# Patient Record
Sex: MALE | Race: WHITE | Employment: FULL TIME | URBAN - METROPOLITAN AREA
[De-identification: names, ages, dates, MRNs, and addresses within clinical notes are randomized per-mention and may not be internally consistent; named-entity substitution may affect disease eponyms.]

---

## 2017-05-31 ENCOUNTER — OFFICE VISIT (OUTPATIENT)
Dept: OPTOMETRY | Facility: CLINIC | Age: 37
End: 2017-05-31
Payer: COMMERCIAL

## 2017-05-31 DIAGNOSIS — H57.89 IRRITATION OF RIGHT EYE: Primary | ICD-10-CM

## 2017-05-31 PROCEDURE — 99202 OFFICE O/P NEW SF 15 MIN: CPT | Performed by: OPTOMETRIST

## 2017-05-31 RX ORDER — CETIRIZINE HYDROCHLORIDE 10 MG/1
10 TABLET ORAL DAILY
COMMUNITY
End: 2018-10-18

## 2017-05-31 ASSESSMENT — VISUAL ACUITY
OS_SC: 20/25
OD_SC+: -1
OS_SC+: -1
METHOD: SNELLEN - LINEAR
OD_SC: 20/20

## 2017-05-31 ASSESSMENT — SLIT LAMP EXAM - LIDS: COMMENTS: NORMAL

## 2017-05-31 ASSESSMENT — EXTERNAL EXAM - LEFT EYE: OS_EXAM: NORMAL

## 2017-05-31 ASSESSMENT — EXTERNAL EXAM - RIGHT EYE: OD_EXAM: NORMAL

## 2017-05-31 NOTE — PROGRESS NOTES
"Chief Complaint   Patient presents with     Eye Problem Right Eye     FB sensation x 3 days        Here with wife   HPI    Affected eye(s):  Right   Symptoms:     Redness   Foreign body sensation   No photophobia      Duration:  4 days   Frequency:  Constant       Do you have eye pain now?:  No      Comments:  For the last 4 days he has had a foreign body sensation under right lateral upper lid that is annoying. Started while at work on night shift, has been rubbing eye.  No history of power tool use or sweeping, no memory of something landing in right eye. Whatever it was felt like it was in one place, it didn't seem to move or float around.     Today it seems a bit better- \"just phantom irritation now\" no foreign body sensation any longer. Patient does not wear glasses or contacts, he hasn't had an exam for 10+ years              HPI and ROS performed by Pari Goldman, OD  scribed by Cecilia Apple Optometric Assistant       See Review Of Systems     Medical, surgical and family histories reviewed and updated 5/31/2017.         OBJECTIVE: See Ophthalmology exam    ASSESSMENT:    ICD-10-CM    1. Irritation of right eye H57.8 polyethylene glycol 400 (BLINK TEARS) 0.25 % SOLN ophthalmic solution      PLAN:    Patient Instructions   Patient was advised of today's exam findings.  Use artificial tears twice a day  Avoid touching right eye         Pari Goldman O.D.  [facility]   [address] [place], [place] [id number] [phone]       "

## 2017-05-31 NOTE — PATIENT INSTRUCTIONS
Patient was advised of today's exam findings.  Use artificial tears twice a day  Avoid touching right eye         Pari Goldman O.D.  [facility]   [address] [place], [place] [id number] [phone]

## 2017-05-31 NOTE — MR AVS SNAPSHOT
"              After Visit Summary   2017    Oskar Genao    MRN: [id number]           Patient Information     Date Of Birth          1980        Visit Information        Provider Department      2017 9:30 AM Pari Goldman OD [facility]        Care Instructions    Patient was advised of today's exam findings.  Use artificial tears twice a day  Avoid touching right eye         Pari Goldman O.D.  [facility]   [address] [place] [address]  [phone]            Follow-ups after your visit        Who to contact     If you have questions or need follow up information about today's clinic visit or your schedule please contact [facility] directly at [phone].  Normal or non-critical lab and imaging results will be communicated to you by MyChart, letter or phone within 4 business days after the clinic has received the results. If you do not hear from us within 7 days, please contact the clinic through MyChart or phone. If you have a critical or abnormal lab result, we will notify you by phone as soon as possible.  Submit refill requests through [employer] or call your pharmacy and they will forward the refill request to us. Please allow 3 business days for your refill to be completed.          Additional Information About Your Visit        MyCharmChron Information     [employer] lets you send messages to your doctor, view your test results, renew your prescriptions, schedule appointments and more. To sign up, go to [email or web address] . Click on \"Log in\" on the left side of the screen, which will take you to the Welcome page. Then click on \"Sign up Now\" on the right side of the page.     You will be asked to enter the access code listed below, as well as some personal information. Please follow the directions to create your username and password.     Your access code is: [email or web address]  Expires: 2017 10:14 AM     Your access code will  in 90 " days. If you need help or a new code, please call your [facility] clinic or [phone].        Care EveryWhere ID     This is your Care EveryWhere ID. This could be used by other organizations to access your [facility] medical records  [id number]         Blood Pressure from Last 3 Encounters:   12/21/16 122/75   08/11/16 115/78   07/11/16 117/83    Weight from Last 3 Encounters:   12/21/16 85.3 kg (188 lb)   11/01/16 86.2 kg (190 lb)   08/11/16 88 kg (194 lb)              Today, you had the following     No orders found for display       Primary Care Provider Office Phone # Fax #    Jeremie Vizcaino PA-C [phone]       [facility] [address] [facility] [place] [place] [address]        Thank you!     Thank you for choosing [facility]  for your care. Our goal is always to provide you with excellent care. Hearing back from our patients is one way we can continue to improve our services. Please take a few minutes to complete the written survey that you may receive in the mail after your visit with us. Thank you!             Your Updated Medication List - Protect others around you: Learn how to safely use, store and throw away your medicines at www.disposemymeds.org.          This list is accurate as of: 5/31/17 10:14 AM.  Always use your most recent med list.                   Brand Name Dispense Instructions for use    cetirizine 10 MG tablet    zyrTEC     Take 10 mg by mouth daily       FLONASE NA

## 2017-07-17 ENCOUNTER — TELEPHONE (OUTPATIENT)
Dept: INTERNAL MEDICINE | Facility: CLINIC | Age: 37
End: 2017-07-17

## 2017-07-17 ENCOUNTER — RADIANT APPOINTMENT (OUTPATIENT)
Dept: GENERAL RADIOLOGY | Facility: CLINIC | Age: 37
End: 2017-07-17
Attending: INTERNAL MEDICINE
Payer: COMMERCIAL

## 2017-07-17 ENCOUNTER — OFFICE VISIT (OUTPATIENT)
Dept: INTERNAL MEDICINE | Facility: CLINIC | Age: 37
End: 2017-07-17
Payer: COMMERCIAL

## 2017-07-17 VITALS
OXYGEN SATURATION: 99 % | HEART RATE: 68 BPM | WEIGHT: 201 LBS | DIASTOLIC BLOOD PRESSURE: 71 MMHG | TEMPERATURE: 97.1 F | HEIGHT: 71 IN | SYSTOLIC BLOOD PRESSURE: 119 MMHG | BODY MASS INDEX: 28.14 KG/M2

## 2017-07-17 DIAGNOSIS — M25.572 ACUTE LEFT ANKLE PAIN: Primary | ICD-10-CM

## 2017-07-17 DIAGNOSIS — M25.572 ACUTE LEFT ANKLE PAIN: ICD-10-CM

## 2017-07-17 PROCEDURE — 99213 OFFICE O/P EST LOW 20 MIN: CPT | Performed by: INTERNAL MEDICINE

## 2017-07-17 PROCEDURE — 73610 X-RAY EXAM OF ANKLE: CPT | Mod: LT

## 2017-07-17 NOTE — LETTER
[facility]  [address] [facility] [id number] [phone]        July 18, 2017    Oskar Genao  [address]  [facility] [address]            Dear Oskar,    Your X-ray does not show any bone fractures or any other bone abnormalities of your ankle.    Please continue the treatment plan that we discussed at your last clinic visit and let me know if you have any questions via [employer] or by calling [phone].      Nani Sharma MD/mirna        Results for orders placed or performed in visit on 07/17/17   XR Ankle Left G/E 3 Views    Narrative    XR ANKLE LT G/E 3 VW 7/17/2017 11:12 AM    HISTORY: Pain.    COMPARISON: None.      Impression    IMPRESSION: No evidence of acute fracture or malalignment. Ankle  mortise is intact. There is soft tissue swelling overlying the lateral  malleolus.    MELINDA MARTINEZ MD

## 2017-07-17 NOTE — PATIENT INSTRUCTIONS
We will let you know if the radiologist noted something on the x-ray.  You may use ibuprofen: You may take ibuprofen as follows: 600mg every 6 hours as needed or 800mg every 8 hours as needed for pain.    Also see below:  Treating Ankle Sprains  Treatment will depend on how bad your sprain is. For a severe sprain, healing may take 3 months or more.  Right after your injury: Use R.I.C.E.    Rest: At first, keep weight off the ankle as much as you can. You may be given crutches to help you walk without putting weight on the ankle.    Ice: Put an ice pack on the ankle for 15 minutes. Remove the pack and wait at least 30 minutes. Repeat for up to 3 days. This helps reduce swelling.    Compression: To reduce swelling and keep the joint stable, you may need to wrap the ankle with an elastic bandage. For more severe sprains, you may need an ankle brace or a cast.    Elevation: To reduce swelling, keep your ankle raised above your heart when you sit or lie down.  Medicine  Your healthcare provider may suggest oral non-steroidal anti-inflammatory medicine (NSAIDs), such as ibuprofen. This relieves the pain and helps reduce any swelling. Be sure to take your medicine as directed.  Contrast baths  After 3 days, soak your ankle in warm water for 30 seconds, then in cool water for 30 seconds. Go back and forth for 5 minutes. Doing this every 2 hours will help keep the swelling down.  Exercises    After about 2 to 3 weeks, you may be given exercises to strengthen the ligaments and muscles in the ankle. Doing these exercises will help prevent another ankle sprain. Exercises may include standing on your toes and then on your heels and doing ankle curls.  Ankle curls    Sit on the edge of a sturdy table or lie on your back.    Pull your toes toward you. Then point them away from you. Repeat for 2 to 3 minutes.   Date Last Reviewed: 9/28/2015 2000-2017 [employer]. [address] [place] [address], [place] [address]. All  rights reserved. This information is not intended as a substitute for professional medical care. Always follow your healthcare professional's instructions.

## 2017-07-17 NOTE — PROGRESS NOTES
SUBJECTIVE:                                                    Oskar Genao is a 37 year old male who presents to clinic today for the following health issues:      Joint Pain    Onset: 2 weeks ago     Description:   Location: left ankle  Character: Sharp, Burning and grinding, throbbing    Intensity: 4/10    Progression of Symptoms: worse    Accompanying Signs & Symptoms:  Other symptoms: none    History:   Previous similar pain: no       Precipitating factors:   Trauma or overuse: YES- fell down stairs, feels like ankle rolled on stairs -no bruising at that time.     Alleviating factors:  Improved by: none    Therapies Tried and outcome:  rest/inactivity, ice and elevation and wrap. The ankle was better for a week and then it started getting worse.     The pain is getting worse.   Patient took ibuprofen 400mg yesterday-with partial affect.               Problem list and histories reviewed & adjusted, as indicated.  Additional history: as documented    Patient Active Problem List   Diagnosis     Hearing loss     Seasonal allergies     Lateral epicondylitis (tennis elbow), left     Hyperlipidemia LDL goal <130     Microscopic hematuria     History reviewed. No pertinent surgical history.    Social History   Substance Use Topics     Smoking status: Never Smoker     Smokeless tobacco: Not on file     Alcohol use No     Family History   Problem Relation Age of Onset     Glaucoma No family hx of      Macular Degeneration No family hx of          Current Outpatient Prescriptions   Medication Sig Dispense Refill     cetirizine (ZYRTEC) 10 MG tablet Take 10 mg by mouth daily       polyethylene glycol 400 (BLINK TEARS) 0.25 % SOLN ophthalmic solution Place 1 drop into both eyes 2 times daily as needed for dry eyes (Patient not taking: Reported on 7/17/2017)       Fluticasone Propionate (FLONASE NA)          Reviewed and updated as needed this visit by clinical staff  Tobacco  Allergies  Meds  Med Hx  Surg Hx  Fam  "Hx  Soc Hx      Reviewed and updated as needed this visit by Provider           ==============================================================  ROS:  Constitutional, HEENT, cardiovascular, pulmonary, GI, , musculoskeletal, neuro, skin, endocrine and psych systems are negative, except as otherwise noted.       OBJECTIVE:                                                    /71  Pulse 68  Temp 97.1  F (36.2  C) (Oral)  Ht 5' 10.5\" (1.791 m)  Wt 201 lb (91.2 kg)  SpO2 99%  BMI 28.43 kg/m2  Body mass index is 28.43 kg/(m^2).     GEN: not in acute distress  MSK:  left lower extremity:  swelling of the posterior aspect of the lateral malleolus and pain; also pain on eversion and on plantar flexion of the left foot. No NV compromise. No bruising.   Mild limping noted on ambulation.     XR ANKLE LT G/E 3 VW 7/17/2017 11:12 AM     HISTORY: Pain.     COMPARISON: None.         IMPRESSION: No evidence of acute fracture or malalignment. Ankle  mortise is intact. There is soft tissue swelling overlying the lateral  malleolus.     MELINDA MARTINEZ MD      ASSESSMENT/PLAN:                                                        ICD-10-CM    1. Acute left ankle pain M25.572 XR Ankle Left G/E 3 Views     ASSESSMENT: muscle strain; no bone pathology noted on XR  PLAN:  Patient Instructions     We will let you know if the radiologist noted something on the x-ray.  You may use ibuprofen: You may take ibuprofen as follows: 600mg every 6 hours as needed or 800mg every 8 hours as needed for pain.    Also see below:  Treating Ankle Sprains  Treatment will depend on how bad your sprain is. For a severe sprain, healing may take 3 months or more.  Right after your injury: Use R.I.C.E.    Rest: At first, keep weight off the ankle as much as you can. You may be given crutches to help you walk without putting weight on the ankle.    Ice: Put an ice pack on the ankle for 15 minutes. Remove the pack and wait at least 30 minutes. Repeat for " up to 3 days. This helps reduce swelling.    Compression: To reduce swelling and keep the joint stable, you may need to wrap the ankle with an elastic bandage. For more severe sprains, you may need an ankle brace or a cast.    Elevation: To reduce swelling, keep your ankle raised above your heart when you sit or lie down.  Medicine  Your healthcare provider may suggest oral non-steroidal anti-inflammatory medicine (NSAIDs), such as ibuprofen. This relieves the pain and helps reduce any swelling. Be sure to take your medicine as directed.  Contrast baths  After 3 days, soak your ankle in warm water for 30 seconds, then in cool water for 30 seconds. Go back and forth for 5 minutes. Doing this every 2 hours will help keep the swelling down.  Exercises    After about 2 to 3 weeks, you may be given exercises to strengthen the ligaments and muscles in the ankle. Doing these exercises will help prevent another ankle sprain. Exercises may include standing on your toes and then on your heels and doing ankle curls.  Ankle curls    Sit on the edge of a sturdy table or lie on your back.    Pull your toes toward you. Then point them away from you. Repeat for 2 to 3 minutes.   Date Last Reviewed: 9/28/2015 2000-2017 [employer]. [address] [place] [address], [place] [address]. All rights reserved. This information is not intended as a substitute for professional medical care. Always follow your healthcare professional's instructions.                         Nani Sharma MD  [facility]

## 2017-07-17 NOTE — TELEPHONE ENCOUNTER
Please advise the patient's wife that there is no noted fracture or other bone abnormalities on the X-ray. He should continue the treatment plan advise at his visit and return to clinic if his ankle is not better in a week, sooner if it gets worse.    Thank you,  Nani Sharma MD

## 2017-07-17 NOTE — NURSING NOTE
"Chief Complaint   Patient presents with     Trauma     left       Initial /71  Pulse 68  Temp 97.1  F (36.2  C) (Oral)  Ht 5' 10.5\" (1.791 m)  Wt 201 lb (91.2 kg)  SpO2 99%  BMI 28.43 kg/m2 Estimated body mass index is 28.43 kg/(m^2) as calculated from the following:    Height as of this encounter: 5' 10.5\" (1.791 m).    Weight as of this encounter: 201 lb (91.2 kg).  Medication Reconciliation: complete     Pretty Mooney MA      "

## 2017-07-17 NOTE — MR AVS SNAPSHOT
After Visit Summary   7/17/2017    Oskar Genao    MRN: [id number]           Patient Information     Date Of Birth          1980        Visit Information        Provider Department      7/17/2017 9:50 AM Nani Sharma MD [facility]        Today's Diagnoses     Acute left ankle pain    -  1      Care Instructions    We will let you know if the radiologist noted something on the x-ray.  You may use ibuprofen: You may take ibuprofen as follows: 600mg every 6 hours as needed or 800mg every 8 hours as needed for pain.    Also see below:  Treating Ankle Sprains  Treatment will depend on how bad your sprain is. For a severe sprain, healing may take 3 months or more.  Right after your injury: Use R.I.C.E.    Rest: At first, keep weight off the ankle as much as you can. You may be given crutches to help you walk without putting weight on the ankle.    Ice: Put an ice pack on the ankle for 15 minutes. Remove the pack and wait at least 30 minutes. Repeat for up to 3 days. This helps reduce swelling.    Compression: To reduce swelling and keep the joint stable, you may need to wrap the ankle with an elastic bandage. For more severe sprains, you may need an ankle brace or a cast.    Elevation: To reduce swelling, keep your ankle raised above your heart when you sit or lie down.  Medicine  Your healthcare provider may suggest oral non-steroidal anti-inflammatory medicine (NSAIDs), such as ibuprofen. This relieves the pain and helps reduce any swelling. Be sure to take your medicine as directed.  Contrast baths  After 3 days, soak your ankle in warm water for 30 seconds, then in cool water for 30 seconds. Go back and forth for 5 minutes. Doing this every 2 hours will help keep the swelling down.  Exercises    After about 2 to 3 weeks, you may be given exercises to strengthen the ligaments and muscles in the ankle. Doing these exercises will help prevent another ankle sprain.  "Exercises may include standing on your toes and then on your heels and doing ankle curls.  Ankle curls    Sit on the edge of a sturdy table or lie on your back.    Pull your toes toward you. Then point them away from you. Repeat for 2 to 3 minutes.   Date Last Reviewed: 9/28/2015 2000-2017 [employer]. [address] [place] [address]. All rights reserved. This information is not intended as a substitute for professional medical care. Always follow your healthcare professional's instructions.                Follow-ups after your visit        Future tests that were ordered for you today     Open Future Orders        Priority Expected Expires Ordered    XR Ankle Left G/E 3 Views Routine 7/17/2017 7/17/2018 7/17/2017            Who to contact     If you have questions or need follow up information about today's clinic visit or your schedule please contact [facility] directly at [phone].  Normal or non-critical lab and imaging results will be communicated to you by [employer], letter or phone within 4 business days after the clinic has received the results. If you do not hear from us within 7 days, please contact the clinic through [employer] or phone. If you have a critical or abnormal lab result, we will notify you by phone as soon as possible.  Submit refill requests through [employer] or call your pharmacy and they will forward the refill request to us. Please allow 3 business days for your refill to be completed.          Additional Information About Your Visit        [employer] Information     [employer] lets you send messages to your doctor, view your test results, renew your prescriptions, schedule appointments and more. To sign up, go to [email or web address] . Click on \"Log in\" on the left side of the screen, which will take you to the Welcome page. Then click on \"Sign up Now\" on the right side of the page.     You will be asked to enter the access code listed below, as well as " "some personal information. Please follow the directions to create your username and password.     Your access code is: [email or web address]  Expires: 2017 10:14 AM     Your access code will  in 90 days. If you need help or a new code, please call your [facility] clinic or [phone].        Care EveryWhere ID     This is your Care EveryWhere ID. This could be used by other organizations to access your [facility] medical records  [id number]        Your Vitals Were     Pulse Temperature Height Pulse Oximetry BMI (Body Mass Index)       68 97.1  F (36.2  C) (Oral) 5' 10.5\" (1.791 m) 99% 28.43 kg/m2        Blood Pressure from Last 3 Encounters:   17 119/71   16 122/75   16 115/78    Weight from Last 3 Encounters:   17 201 lb (91.2 kg)   16 188 lb (85.3 kg)   16 190 lb (86.2 kg)               Primary Care Provider Office Phone # Fax #    Jeremie Vizcaino PA-C [phone]       [facility] [address] [facility] [address]        Equal Access to Services     ECTOR FRAUSTO AH: Hadii jarrod ku hadasho Soomaali, waaxda luqadaha, qaybta kaalmada adeegyada, waxay idiin haysteven melanie obando. So [facility] [phone].    ATENCIÓN: Si habla español, tiene a perez disposición servicios gratuitos de asistencia lingüística. Llame al [phone].    We comply with applicable federal civil rights laws and [place] laws. We do not discriminate on the basis of race, color, national origin, age, disability sex, sexual orientation or gender identity.            Thank you!     Thank you for choosing [facility]  for your care. Our goal is always to provide you with excellent care. Hearing back from our patients is one way we can continue to improve our services. Please take a few minutes to complete the written survey that you may receive in the mail after your visit with us. Thank you!             Your Updated Medication List - Protect others around you: " Learn how to safely use, store and throw away your medicines at www.disposemymeds.org.          This list is accurate as of: 7/17/17 11:02 AM.  Always use your most recent med list.                   Brand Name Dispense Instructions for use Diagnosis    cetirizine 10 MG tablet    zyrTEC     Take 10 mg by mouth daily        FLONASE NA           polyethylene glycol 400 0.25 % Soln ophthalmic solution    BLINK TEARS     Place 1 drop into both eyes 2 times daily as needed for dry eyes    Irritation of right eye

## 2017-07-18 NOTE — PROGRESS NOTES
Please send letter informing the patient and attach results:    Dear Oskar,    Your X-ray does not show any bone fractures or any other bone abnormalities of your ankle.    Please continue the treatment plan that we discussed at your last clinic visit and let me know if you have any questions via [employer] or by calling [phone].      Nani Sharma MD

## 2017-09-05 ENCOUNTER — OFFICE VISIT (OUTPATIENT)
Dept: URGENT CARE | Facility: URGENT CARE | Age: 37
End: 2017-09-05
Payer: COMMERCIAL

## 2017-09-05 VITALS
WEIGHT: 202 LBS | BODY MASS INDEX: 28.57 KG/M2 | RESPIRATION RATE: 14 BRPM | SYSTOLIC BLOOD PRESSURE: 114 MMHG | DIASTOLIC BLOOD PRESSURE: 79 MMHG | HEART RATE: 86 BPM | TEMPERATURE: 97.5 F

## 2017-09-05 DIAGNOSIS — H93.8X3 SENSATION OF PLUGGED EAR ON BOTH SIDES: Primary | ICD-10-CM

## 2017-09-05 DIAGNOSIS — Z86.69 HISTORY OF HEARING LOSS: ICD-10-CM

## 2017-09-05 PROCEDURE — 99213 OFFICE O/P EST LOW 20 MIN: CPT | Performed by: PHYSICIAN ASSISTANT

## 2017-09-05 NOTE — MR AVS SNAPSHOT
After Visit Summary   9/5/2017    Oskar Genao    MRN: [id number]           Patient Information     Date Of Birth          1980        Visit Information        Provider Department      9/5/2017 5:55 PM Fanny Valles PA-C [facility]        Today's Diagnoses     Sensation of plugged ear on both sides    -  1    History of hearing loss           Follow-ups after your visit        Additional Services     OTOLARYNGOLOGY REFERRAL       Your provider has referred you to: FM[facility]: [facility] [phone]  [email or web address]    Please be aware that coverage of these services is subject to the terms and limitations of your health insurance plan.  Call member services at your health plan with any benefit or coverage questions.      Please bring the following with you to your appointment:    (1) Any X-Rays, CTs or MRIs which have been performed.  Contact the facility where they were done to arrange for  prior to your scheduled appointment.   (2) List of current medications  (3) This referral request   (4) Any documents/labs given to you for this referral                  Who to contact     If you have questions or need follow up information about today's clinic visit or your schedule please contact [facility] directly at [phone].  Normal or non-critical lab and imaging results will be communicated to you by MyChart, letter or phone within 4 business days after the clinic has received the results. If you do not hear from us within 7 days, please contact the clinic through MyChart or phone. If you have a critical or abnormal lab result, we will notify you by phone as soon as possible.  Submit refill requests through "[employer] Simple[employer] Fast and Elegant Tablet [employer] App" or call your pharmacy and they will forward the refill request to us. Please allow 3 business days for your refill to be completed.          Additional Information About Your Visit        [facility]  "Information     [employer] lets you send messages to your doctor, view your test results, renew your prescriptions, schedule appointments and more. To sign up, go to [email or web address] . Click on \"Log in\" on the left side of the screen, which will take you to the Welcome page. Then click on \"Sign up Now\" on the right side of the page.     You will be asked to enter the access code listed below, as well as some personal information. Please follow the directions to create your username and password.     Your access code is: [email or web address]  Expires: 2017  6:29 PM     Your access code will  in 90 days. If you need help or a new code, please call your [place] clinic or [phone].        Care EveryWhere ID     This is your [facility] EveryWhere ID. This could be used by other organizations to access your [place] medical records  [id number]        Your Vitals Were     Pulse Temperature Respirations BMI (Body Mass Index)          86 97.5  F (36.4  C) 14 28.57 kg/m2         Blood Pressure from Last 3 Encounters:   17 114/79   17 119/71   16 122/75    Weight from Last 3 Encounters:   17 202 lb (91.6 kg)   17 201 lb (91.2 kg)   16 188 lb (85.3 kg)              We Performed the Following     OTOLARYNGOLOGY REFERRAL        Primary Care Provider Office Phone # Fax #    Jeremie Vizcaino PA-C [phone] [address] [facility] [address]        Equal Access to Services     [facility] : Hadii jarrod Rutledge, waaxda luqadaha, qaybta kaalmasneha jean, lexii obando. So [facility] [phone].    ATENCIÓN: Si habla español, tiene a perez disposición servicios gratuitos de asistencia lingüística. Llame al [phone].    We comply with applicable federal civil rights laws and [place] laws. We do not discriminate on the basis of race, color, national origin, age, disability sex, sexual orientation or gender identity.          "   Thank you!     Thank you for choosing [facility]  for your care. Our goal is always to provide you with excellent care. Hearing back from our patients is one way we can continue to improve our services. Please take a few minutes to complete the written survey that you may receive in the mail after your visit with us. Thank you!             Your Updated Medication List - Protect others around you: Learn how to safely use, store and throw away your medicines at www.disposemymeds.org.          This list is accurate as of: 9/5/17  6:29 PM.  Always use your most recent med list.                   Brand Name Dispense Instructions for use Diagnosis    cetirizine 10 MG tablet    zyrTEC     Take 10 mg by mouth daily        FLONASE NA           polyethylene glycol 400 0.25 % Soln ophthalmic solution    BLINK TEARS     Place 1 drop into both eyes 2 times daily as needed for dry eyes    Irritation of right eye

## 2017-09-05 NOTE — NURSING NOTE
"Chief Complaint   Patient presents with     Otalgia     both ( but the right is worst)       Initial /79  Pulse 86  Temp 97.5  F (36.4  C)  Resp 14  Wt 202 lb (91.6 kg)  BMI 28.57 kg/m2 Estimated body mass index is 28.57 kg/(m^2) as calculated from the following:    Height as of 7/17/17: 5' 10.5\" (1.791 m).    Weight as of this encounter: 202 lb (91.6 kg).  Medication Reconciliation: complete     Lori Rosario. MA      "

## 2017-09-05 NOTE — PROGRESS NOTES
SUBJECTIVE:                                                    Oskar Genao is a 37 year old male who presents to clinic today for the following health issues:      EAR SYMPTOMS      Duration: 2 week, R> L    Description  ear pain both    Severity: moderate    Accompanying signs and symptoms: None    History (predisposing factors):  none    Precipitating or alleviating factors: None    Therapies tried and outcome:  none    Hearing aids- H/o genetic hearing loss. Hearing worse for 2 weeks, worried about infection    No Known Allergies    Past Medical History:   Diagnosis Date     Hearing loss          Current Outpatient Prescriptions on File Prior to Visit:  cetirizine (ZYRTEC) 10 MG tablet Take 10 mg by mouth daily   polyethylene glycol 400 (BLINK TEARS) 0.25 % SOLN ophthalmic solution Place 1 drop into both eyes 2 times daily as needed for dry eyes (Patient not taking: Reported on 7/17/2017)   Fluticasone Propionate (FLONASE NA)      No current facility-administered medications on file prior to visit.     Social History   Substance Use Topics     Smoking status: Never Smoker     Smokeless tobacco: Not on file     Alcohol use No       ROS:  Consitutional: As above  ENT: As above  Respiratory: As above    OBJECTIVE:  /79  Pulse 86  Temp 97.5  F (36.4  C)  Resp 14  Wt 202 lb (91.6 kg)  BMI 28.57 kg/m2  GENERAL APPEARANCE: healthy, alert and no distress  EYES: conjunctiva clear  EARS: No cerumen.   Ear canals w/o erythema, TM's intact w/o erythema.    NOSE/MOUTH: Nose and mouth without ulcers, erythema or lesions  SINUSES: No maxillary sinus tenderness.  THROAT: Mild erythema w/o tonsillar enlargement . No exudates  NECK: supple, nontender, no lymphadenopathy  RESP: lungs clear to auscultation - no rales, rhonchi or wheezes  CV: regular rates and rhythm, normal S1 S2, no murmur noted  NEURO: awake, alert        ASSESSMENT: Well appearing.    ICD-10-CM    1. Sensation of plugged ear on both sides  H93.8X3 OTOLARYNGOLOGY REFERRAL   2. History of hearing loss Z86.69 OTOLARYNGOLOGY REFERRAL     PLAN: See ENT this week.  Lots of rest and fluids.  RTC if any worsening symptoms or if not improving.    Fanny Valles PA-C

## 2017-09-08 ENCOUNTER — TELEPHONE (OUTPATIENT)
Dept: OTOLARYNGOLOGY | Facility: CLINIC | Age: 37
End: 2017-09-08

## 2017-09-08 NOTE — TELEPHONE ENCOUNTER
Reason for call:  Other   Patient called regarding (reason for call): appointment  Additional comments: Patient needs to get in and is asking to be squeezed in sooner than 09/19; please call back.      Phone number to reach patient:  Home number on file [phone] (home)    Best Time:  ASAP    Can we leave a detailed message on this number?  YES

## 2017-09-11 NOTE — TELEPHONE ENCOUNTER
Patient informed that 9/19/17 is the earliest that he can be seen by Dr. Norwood because he is on vacation until then. Patient had an appointment also scheduled with Dr. Waite on 9/12/17 but requested that I cancel that appointment.    Claus Piper, [facility]

## 2017-09-19 ENCOUNTER — OFFICE VISIT (OUTPATIENT)
Dept: OTOLARYNGOLOGY | Facility: CLINIC | Age: 37
End: 2017-09-19
Payer: COMMERCIAL

## 2017-09-19 VITALS — HEIGHT: 71 IN | BODY MASS INDEX: 28.39 KG/M2 | TEMPERATURE: 97.2 F | RESPIRATION RATE: 16 BRPM | WEIGHT: 202.8 LBS

## 2017-09-19 DIAGNOSIS — H90.3 SENSORINEURAL HEARING LOSS (SNHL) OF BOTH EARS: ICD-10-CM

## 2017-09-19 DIAGNOSIS — H69.91 DYSFUNCTION OF EUSTACHIAN TUBE, RIGHT: Primary | ICD-10-CM

## 2017-09-19 PROCEDURE — 99214 OFFICE O/P EST MOD 30 MIN: CPT | Performed by: OTOLARYNGOLOGY

## 2017-09-19 RX ORDER — METHYLPREDNISOLONE 4 MG
TABLET, DOSE PACK ORAL
Qty: 21 TABLET | Refills: 0 | Status: SHIPPED | OUTPATIENT
Start: 2017-09-19 | End: 2018-05-08

## 2017-09-19 NOTE — PATIENT INSTRUCTIONS
General Scheduling Information  To schedule your CT/MRI scan, please contact [place] at [phone]   [address] [place] [address]    To schedule your Surgery, please contact our Specialty Schedulers at [phone]    ENT Clinic Locations Clinic Hours Telephone Number     [facility]  [address] [place] [address]   Tuesday:       8:00am -- 4:00pm    Wednesday:  8:00am - 4:00pm   To schedule an appointment with   Dr. Norwood,   please contact our   Specialty Scheduling Department at:     [phone]       [facility]  [address] [place] [address]   Friday:          8:00am - 4:00pm         Urgent Care Locations Clinic Hours Telephone Numbers     [facility]  [address] [place] [address]     Monday-Friday:     11:00pm - 9:00pm    Saturday-Sunday:  9:00am - 5:00pm   [phone]     [facility]  [address] [place] [address]     Monday-Friday:      5:00pm - 9:00pm     Saturday-Sunday:  9:00am - 5:00pm   [phone]

## 2017-09-19 NOTE — NURSING NOTE
"Chief Complaint   Patient presents with     Ear Problem     Plugged ears and right ear pain       Initial Temp 97.2  F (36.2  C) (Oral)  Resp 16  Ht 1.791 m (5' 10.5\")  Wt 92 kg (202 lb 12.8 oz)  BMI 28.69 kg/m2 Estimated body mass index is 28.69 kg/(m^2) as calculated from the following:    Height as of this encounter: 1.791 m (5' 10.5\").    Weight as of this encounter: 92 kg (202 lb 12.8 oz).  Medication Reconciliation: complete     Claus Piper CMA      "

## 2017-09-19 NOTE — PROGRESS NOTES
Chief Complaint - Hearing loss, plugged ears    History of Present Illness - Oskar Genao is a 37 year old male who presents to me today with hearing loss in both ears.  It has been present and noticeable for approximately many years, since childhood. Has plugged feeling and pressure on the inside right. Had a little on the left. Has been going on for weeks. It won't drain for him. Has had this in the past. No pain, but uncomfortable. Left feels normal now. History of ear tubes. Wears hearing aids. With regards to recreational, , and work-related noise exposure has none. + family history of hearing loss at a young age, son age 8 with sensorineural hearing loss, dad, and sister. The patient denies vertigo, otalgia. Hasn't been sick. Only gets spring allergies. Goes to [facility] hearing [facility] in [place].     Past Medical History -   Patient Active Problem List   Diagnosis     Hearing loss     Seasonal allergies     Lateral epicondylitis (tennis elbow), left     Hyperlipidemia LDL goal <130     Microscopic hematuria       Current Medications -   Current Outpatient Prescriptions:      cetirizine (ZYRTEC) 10 MG tablet, Take 10 mg by mouth daily, Disp: , Rfl:      Fluticasone Propionate (FLONASE NA), , Disp: , Rfl:      polyethylene glycol 400 (BLINK TEARS) 0.25 % SOLN ophthalmic solution, Place 1 drop into both eyes 2 times daily as needed for dry eyes (Patient not taking: Reported on 7/17/2017), Disp: , Rfl:     Allergies - No Known Allergies    Social History -   Social History     Social History     Marital Status:      Spouse Name: N/A     Number of Children: N/A     Years of Education: N/A     Social History Main Topics     Smoking status: Never Smoker      Smokeless tobacco: None     Alcohol Use: No     Drug Use: No     Sexual Activity:     Partners: Female     Other Topics Concern     None     Social History Narrative       Family History -   Family History   Problem Relation Age of Onset      "Glaucoma No family hx of      Macular Degeneration No family hx of        Review of Systems - As per HPI and PMHx, seasonal allergies, no nasal obstruction, otherwise 7 system review of the head and neck negative.    Physical Exam  Temp 97.2  F (36.2  C) (Oral)  Resp 16  Ht 1.791 m (5' 10.5\")  Wt 92 kg (202 lb 12.8 oz)  BMI 28.69 kg/m2  General - The patient is in no distress.  Alert and oriented to person and place, answers questions and cooperates with examination appropriately.   Voice and Breathing - The patient was breathing comfortably without the use of accessory muscles. There was no wheezing, stridor, or stertor.  The patients voice was clear and strong.  Ears - The auricles are normal. The tympanic membranes are normal in appearance, bony landmarks are intact.  No retraction, perforation, or masses.  No fluid or purulence was seen in the external canal or the middle ear. No evidence of infection of the middle ear or external canal, cerumen was normal in appearance. He can insufflate right middle ear.  Eyes - Extraocular movements intact.  Sclera were not icteric or injected.  Mouth - Examination of the oral cavity showed pink, healthy mucosa. No lesions or ulcerations noted.  The tongue was mobile and midline.  Throat - The walls of the oropharynx were smooth, symmetric, and had no lesions or ulcerations. The uvula was midline on elevation.    Neck - Palpation of the occipital, submental, submandibular, internal jugular chain, and supraclavicular nodes did not demonstrate any abnormal lymph nodes or masses. Parotid glands had no masses. Palpation of the thyroid was soft and smooth, with no nodules or goiter appreciated.  The trachea was mobile and midline.  Neurological - Cranial nerves 2 through 12 were grossly intact. House-Brackmann grade 1 out of 6 bilaterally.     Assessment and Plan - Oskar Genao is a 37 year old male who returns to me today with hearing loss.  This is most consistent with " genetic/familial sensorineural hearing loss. He feels it is stable. He gets his hearing tested and hearing aids elsewhere. However, he has intermittent ear plugging, worse on the right. No fluid on exam, but this maybe eustachian tube dysfunction. He can try insufflation, which he has been doing, flonase, and a medrol dose pack. If this fails, he needs to have an audiogram and return.    Leander Norwood MD  Otolaryngology  [facility]

## 2017-09-19 NOTE — MR AVS SNAPSHOT
After Visit Summary   9/19/2017    Oskar Genao    MRN: [id number]           Patient Information     Date Of Birth          1980        Visit Information        Provider Department      9/19/2017 9:00 AM Leander Norwood MD [facility] [place]        Today's Diagnoses     Dysfunction of eustachian tube, right    -  1    Sensorineural hearing loss (SNHL) of both ears          Care Instructions    General Scheduling Information  To schedule your CT/MRI scan, please contact [place] Kim at [phone] [address] [place] [address]    To schedule your Surgery, please contact our Specialty Schedulers at [phone]    ENT Clinic Locations Clinic Hours Telephone Number     [place]  [address]  [place] [address]   Tuesday:       8:00am -- 4:00pm    Wednesday:  8:00am - 4:00pm   To schedule an appointment with   Dr. Norwood,   please contact our   Specialty Scheduling Department at:     [phone]       [facility]  [address] [place] [address]   Friday:          8:00am - 4:00pm         Urgent Care Locations Clinic Hours Telephone Numbers     [facility]  [address] [place] [address]     Monday-Friday:     11:00pm - 9:00pm    Saturday-Sunday:  9:00am - 5:00pm   [phone]     [facility]  [address] [place] [address]     Monday-Friday:      5:00pm - 9:00pm     Saturday-Sunday:  9:00am - 5:00pm   [phone]                 Follow-ups after your visit        Who to contact     If you have questions or need follow up information about today's clinic visit or your schedule please contact [facility] directly at [phone].  Normal or non-critical lab and imaging results will be communicated to you by MyChart, letter or phone within 4 business days after the clinic has received the results. If you do not hear from us within 7 days, please contact the clinic through MyChart or phone.  "If you have a critical or abnormal lab result, we will notify you by phone as soon as possible.  Submit refill requests through [employer] or call your pharmacy and they will forward the refill request to us. Please allow 3 business days for your refill to be completed.          Additional Information About Your Visit        [employer] Information     [employer] lets you send messages to your doctor, view your test results, renew your prescriptions, schedule appointments and more. To sign up, go to [email or web address] . Click on \"Log in\" on the left side of the screen, which will take you to the Welcome page. Then click on \"Sign up Now\" on the right side of the page.     You will be asked to enter the access code listed below, as well as some personal information. Please follow the directions to create your username and password.     Your access code is: [email or web address]  Expires: 2017  6:29 PM     Your access code will  in 90 days. If you need help or a new code, please call your [place] clinic or [phone].        Care EveryWhere ID     This is your Care EveryWhere ID. This could be used by other organizations to access your [place] medical records  [id number]        Your Vitals Were     Temperature Respirations Height BMI (Body Mass Index)          97.2  F (36.2  C) (Oral) 16 1.791 m (5' 10.5\") 28.69 kg/m2         Blood Pressure from Last 3 Encounters:   17 114/79   17 119/71   16 122/75    Weight from Last 3 Encounters:   17 92 kg (202 lb 12.8 oz)   17 91.6 kg (202 lb)   17 91.2 kg (201 lb)              Today, you had the following     No orders found for display         Today's Medication Changes          These changes are accurate as of: 17 11:53 AM.  If you have any questions, ask your nurse or doctor.               Start taking these medicines.        Dose/Directions    methylPREDNISolone 4 MG tablet   Commonly known as:  MEDROL DOSEPAK   Used for:  " Dysfunction of eustachian tube, right, Sensorineural hearing loss (SNHL) of both ears   Started by:  Leander Norwood MD        Follow package instructions   Quantity:  21 tablet   Refills:  0            Where to get your medicines      These medications were sent to [facility] [place] - [place] - [address]  [address], [place] [address]     Phone:  [phone]     methylPREDNISolone 4 MG tablet                Primary Care Provider Office Phone # Fax #    Jeremie Vizcaino PA-C [phone] [address]  [facility] [address]        Equal Access to Services     [facility] : Hadii aad ku hadasho Soomaali, waaxda luqadaha, qaybta kaalmada adeegyada, waxay idiin haysteven melanie lundberg . So [facility] [phone].    ATENCIÓN: Si habla español, tiene a perez disposición servicios gratuitos de asistencia lingüística. LlPremier [facility] [phone].    We comply with applicable federal civil rights laws and [place] laws. We do not discriminate on the basis of race, color, national origin, age, disability sex, sexual orientation or gender identity.            Thank you!     Thank you for choosing [facility]  for your care. Our goal is always to provide you with excellent care. Hearing back from our patients is one way we can continue to improve our services. Please take a few minutes to complete the written survey that you may receive in the mail after your visit with us. Thank you!             Your Updated Medication List - Protect others around you: Learn how to safely use, store and throw away your medicines at www.disposemymeds.org.          This list is accurate as of: 9/19/17 11:53 AM.  Always use your most recent med list.                   Brand Name Dispense Instructions for use Diagnosis    cetirizine 10 MG tablet    zyrTEC     Take 10 mg by mouth daily        FLONASE NA           methylPREDNISolone 4 MG tablet    MEDROL DOSEPAK    21  tablet    Follow package instructions    Dysfunction of eustachian tube, right, Sensorineural hearing loss (SNHL) of both ears       polyethylene glycol 400 0.25 % Soln ophthalmic solution    BLINK TEARS     Place 1 drop into both eyes 2 times daily as needed for dry eyes    Irritation of right eye

## 2018-05-08 ENCOUNTER — OFFICE VISIT (OUTPATIENT)
Dept: FAMILY MEDICINE | Facility: CLINIC | Age: 38
End: 2018-05-08
Payer: COMMERCIAL

## 2018-05-08 VITALS
BODY MASS INDEX: 28.72 KG/M2 | TEMPERATURE: 97.8 F | SYSTOLIC BLOOD PRESSURE: 111 MMHG | WEIGHT: 203 LBS | HEART RATE: 82 BPM | DIASTOLIC BLOOD PRESSURE: 80 MMHG | OXYGEN SATURATION: 97 % | RESPIRATION RATE: 14 BRPM

## 2018-05-08 DIAGNOSIS — R53.83 FATIGUE, UNSPECIFIED TYPE: Primary | ICD-10-CM

## 2018-05-08 LAB
ANION GAP SERPL CALCULATED.3IONS-SCNC: 9 MMOL/L (ref 3–14)
BASOPHILS # BLD AUTO: 0 10E9/L (ref 0–0.2)
BASOPHILS NFR BLD AUTO: 0.4 %
BUN SERPL-MCNC: 21 MG/DL (ref 7–30)
CALCIUM SERPL-MCNC: 9.2 MG/DL (ref 8.5–10.1)
CHLORIDE SERPL-SCNC: 109 MMOL/L (ref 94–109)
CO2 SERPL-SCNC: 24 MMOL/L (ref 20–32)
CREAT SERPL-MCNC: 1.16 MG/DL (ref 0.66–1.25)
DIFFERENTIAL METHOD BLD: NORMAL
EOSINOPHIL # BLD AUTO: 0.1 10E9/L (ref 0–0.7)
EOSINOPHIL NFR BLD AUTO: 2.4 %
ERYTHROCYTE [DISTWIDTH] IN BLOOD BY AUTOMATED COUNT: 12.7 % (ref 10–15)
GFR SERPL CREATININE-BSD FRML MDRD: 71 ML/MIN/1.7M2
GLUCOSE SERPL-MCNC: 88 MG/DL (ref 70–99)
HCT VFR BLD AUTO: 44.2 % (ref 40–53)
HGB BLD-MCNC: 15.4 G/DL (ref 13.3–17.7)
LYMPHOCYTES # BLD AUTO: 2 10E9/L (ref 0.8–5.3)
LYMPHOCYTES NFR BLD AUTO: 36.8 %
MCH RBC QN AUTO: 30.6 PG (ref 26.5–33)
MCHC RBC AUTO-ENTMCNC: 34.8 G/DL (ref 31.5–36.5)
MCV RBC AUTO: 88 FL (ref 78–100)
MONOCYTES # BLD AUTO: 0.5 10E9/L (ref 0–1.3)
MONOCYTES NFR BLD AUTO: 9.7 %
NEUTROPHILS # BLD AUTO: 2.8 10E9/L (ref 1.6–8.3)
NEUTROPHILS NFR BLD AUTO: 50.7 %
PLATELET # BLD AUTO: 188 10E9/L (ref 150–450)
POTASSIUM SERPL-SCNC: 4 MMOL/L (ref 3.4–5.3)
RBC # BLD AUTO: 5.04 10E12/L (ref 4.4–5.9)
SODIUM SERPL-SCNC: 142 MMOL/L (ref 133–144)
TSH SERPL DL<=0.005 MIU/L-ACNC: 1.72 MU/L (ref 0.4–4)
WBC # BLD AUTO: 5.5 10E9/L (ref 4–11)

## 2018-05-08 PROCEDURE — 80048 BASIC METABOLIC PNL TOTAL CA: CPT | Performed by: PHYSICIAN ASSISTANT

## 2018-05-08 PROCEDURE — 36415 COLL VENOUS BLD VENIPUNCTURE: CPT | Performed by: PHYSICIAN ASSISTANT

## 2018-05-08 PROCEDURE — 99213 OFFICE O/P EST LOW 20 MIN: CPT | Performed by: PHYSICIAN ASSISTANT

## 2018-05-08 PROCEDURE — 85025 COMPLETE CBC W/AUTO DIFF WBC: CPT | Performed by: PHYSICIAN ASSISTANT

## 2018-05-08 PROCEDURE — 84443 ASSAY THYROID STIM HORMONE: CPT | Performed by: PHYSICIAN ASSISTANT

## 2018-05-08 NOTE — MR AVS SNAPSHOT
"              After Visit Summary   5/8/2018    Oskar Genao    MRN: [id number]           Patient Information     Date Of Birth          1980        Visit Information        Provider Department      5/8/2018 1:00 PM Fanny Valles PA-C [facility]        Today's Diagnoses     Fatigue, unspecified type    -  1       Follow-ups after your visit        Future tests that were ordered for you today     Open Future Orders        Priority Expected Expires Ordered    **Vitamin D Deficiency FUTURE anytime Routine 5/8/2018 5/8/2019 5/8/2018            Who to contact     If you have questions or need follow up information about today's clinic visit or your schedule please contact [facility] directly at [phone].  Normal or non-critical lab and imaging results will be communicated to you by MyChart, letter or phone within 4 business days after the clinic has received the results. If you do not hear from us within 7 days, please contact the clinic through [facility] or phone. If you have a critical or abnormal lab result, we will notify you by phone as soon as possible.  Submit refill requests through [employer] or call your pharmacy and they will forward the refill request to us. Please allow 3 business days for your refill to be completed.          Additional Information About Your Visit        [facility] Information     [facility] lets you send messages to your doctor, view your test results, renew your prescriptions, schedule appointments and more. To sign up, go to [email or web address] . Click on \"Log in\" on the left side of the screen, which will take you to the Welcome page. Then click on \"Sign up Now\" on the right side of the page.     You will be asked to enter the access code listed below, as well as some personal information. Please follow the directions to create your username and password.     Your access code is: [email or web address]  Expires: 8/5/2018  1:07 PM     Your access code will "  in 90 days. If you need help or a new code, please call your [place] clinic or [phone].        Care EveryWhere ID     This is your Care EveryWhere ID. This could be used by other organizations to access your [place] medical records  [id number]        Your Vitals Were     Pulse Temperature Respirations Pulse Oximetry BMI (Body Mass Index)       82 97.8  F (36.6  C) (Oral) 14 97% 28.72 kg/m2        Blood Pressure from Last 3 Encounters:   18 111/80   17 114/79   17 119/71    Weight from Last 3 Encounters:   18 203 lb (92.1 kg)   17 202 lb 12.8 oz (92 kg)   17 202 lb (91.6 kg)              We Performed the Following     Basic metabolic panel  (Ca, Cl, CO2, Creat, Gluc, K, Na, BUN)     CBC with platelets differential     TSH with free T4 reflex        Primary Care Provider Office Phone # Fax #    Jeremie Vizcaino PA-C [phone] [address] [facility] [address]        Equal Access to Services     [facility] LISBET : Hadii aad ku hadasho Soomaali, waaxda luqadaha, qaybta kaalmada adeegyada, lexii lundberg . So [facility] [phone].    ATENCIÓN: Si habla español, tiene a perez disposición servicios gratuitos de asistencia lingüística. Llame al [phone].    We comply with applicable federal civil rights laws and [place] laws. We do not discriminate on the basis of race, color, national origin, age, disability, sex, sexual orientation, or gender identity.            Thank you!     Thank you for choosing [facility]  for your care. Our goal is always to provide you with excellent care. Hearing back from our patients is one way we can continue to improve our services. Please take a few minutes to complete the written survey that you may receive in the mail after your visit with us. Thank you!             Your Updated Medication List - Protect others around you: Learn how to safely use, store and throw away your  medicines at www.disposemymeds.org.          This list is accurate as of 5/8/18  1:24 PM.  Always use your most recent med list.                   Brand Name Dispense Instructions for use Diagnosis    cetirizine 10 MG tablet    zyrTEC     Take 10 mg by mouth daily        FLONASE NA           polyethylene glycol 400 0.25 % Soln ophthalmic solution    BLINK TEARS     Place 1 drop into both eyes 2 times daily as needed for dry eyes    Irritation of right eye

## 2018-05-08 NOTE — LETTER
May 9, 2018      Oskar Genao  [address] [facility] [address]        Dear ,    We are writing to inform you of your test results.    Your test results fall within the expected range(s) or remain unchanged from previous results.  Please continue with current treatment plan.    Resulted Orders   TSH with free T4 reflex   Result Value Ref Range    TSH 1.72 0.40 - 4.00 mU/L   CBC with platelets differential   Result Value Ref Range    WBC 5.5 4.0 - 11.0 10e9/L    RBC Count 5.04 4.4 - 5.9 10e12/L    Hemoglobin 15.4 13.3 - 17.7 g/dL    Hematocrit 44.2 40.0 - 53.0 %    MCV 88 78 - 100 fl    MCH 30.6 26.5 - 33.0 pg    MCHC 34.8 31.5 - 36.5 g/dL    RDW 12.7 10.0 - 15.0 %    Platelet Count 188 150 - 450 10e9/L    Diff Method Automated Method     % Neutrophils 50.7 %    % Lymphocytes 36.8 %    % Monocytes 9.7 %    % Eosinophils 2.4 %    % Basophils 0.4 %    Absolute Neutrophil 2.8 1.6 - 8.3 10e9/L    Absolute Lymphocytes 2.0 0.8 - 5.3 10e9/L    Absolute Monocytes 0.5 0.0 - 1.3 10e9/L    Absolute Eosinophils 0.1 0.0 - 0.7 10e9/L    Absolute Basophils 0.0 0.0 - 0.2 10e9/L   Basic metabolic panel  (Ca, Cl, CO2, Creat, Gluc, K, Na, BUN)   Result Value Ref Range    Sodium 142 133 - 144 mmol/L    Potassium 4.0 3.4 - 5.3 mmol/L    Chloride 109 94 - 109 mmol/L    Carbon Dioxide 24 20 - 32 mmol/L    Anion Gap 9 3 - 14 mmol/L    Glucose 88 70 - 99 mg/dL      Comment:      Non Fasting    Urea Nitrogen 21 7 - 30 mg/dL    Creatinine 1.16 0.66 - 1.25 mg/dL    GFR Estimate 71 >60 mL/min/1.7m2      Comment:      Non  GFR Calc    GFR Estimate If Black 86 >60 mL/min/1.7m2      Comment:       GFR Calc    Calcium 9.2 8.5 - 10.1 mg/dL       If you have any questions or concerns, please call the clinic at the number listed above.       Sincerely,        Fanny Valles PA-C

## 2018-05-08 NOTE — PROGRESS NOTES
SUBJECTIVE:   Oskar Genao is a 37 year old male who presents to clinic today for the following health issues:      Patient has family hx of hypothyroidism. Patient has been noticing sx including hair loss- gradual thinning, weight gain, and fatigue - would like thyroid checked.    No cough or ST.   H/O difficulty swallowing- saw ENT for evaluation. No concerns about a disease etiology causing it.    No Known Allergies    Past Medical History:   Diagnosis Date     Hearing loss          Current Outpatient Prescriptions on File Prior to Visit:  polyethylene glycol 400 (BLINK TEARS) 0.25 % SOLN ophthalmic solution Place 1 drop into both eyes 2 times daily as needed for dry eyes   cetirizine (ZYRTEC) 10 MG tablet Take 10 mg by mouth daily   Fluticasone Propionate (FLONASE NA)      No current facility-administered medications on file prior to visit.     Social History   Substance Use Topics     Smoking status: Never Smoker     Smokeless tobacco: Not on file     Alcohol use No       ROS:  CONSTITUTIONAL: Negative for or fever.  EYES: Negative for eye problems.  ENT: As above.  RESP: As above.  CV: Negative for chest pains.  GI: Negative for vomiting.  MUSCULOSKELETAL:  Negative for significant muscle or joint pains.  NEUROLOGIC: Some headaches.  SKIN: Negative for rash.    OBJECTIVE:  /80  Pulse 82  Temp 97.8  F (36.6  C) (Oral)  Resp 14  Wt 203 lb (92.1 kg)  SpO2 97%  BMI 28.72 kg/m2  GENERAL APPEARANCE: Healthy, alert and no distress.  EYES:Conjunctiva/sclera clear.  THROAT: No erythema w/o tonsillar enlargement . No exudates.  NECK: Supple, nontender, no lymphadenopathy. No thyromegaly. No nodules.  RESP: Lungs clear to auscultation - no rales, rhonchi or wheezes  CV: Regular rate and rhythm, normal S1 S2, no murmur noted.  NEURO: Awake, alert    SKIN: No rashes        ASSESSMENT:     ICD-10-CM    1. Fatigue, unspecified type R53.83 TSH with free T4 reflex     CBC with platelets differential      **Vitamin D Deficiency FUTURE anytime     Basic metabolic panel  (Ca, Cl, CO2, Creat, Gluc, K, Na, BUN)         PLAN:  Lots of rest and fluids.  RTC if any worsening symptoms or if not improving.    Fanny Valles PA-C

## 2018-10-18 ENCOUNTER — OFFICE VISIT (OUTPATIENT)
Dept: FAMILY MEDICINE | Facility: CLINIC | Age: 38
End: 2018-10-18
Payer: COMMERCIAL

## 2018-10-18 VITALS
OXYGEN SATURATION: 98 % | DIASTOLIC BLOOD PRESSURE: 74 MMHG | RESPIRATION RATE: 14 BRPM | HEIGHT: 70 IN | BODY MASS INDEX: 27.8 KG/M2 | SYSTOLIC BLOOD PRESSURE: 119 MMHG | TEMPERATURE: 97.6 F | WEIGHT: 194.2 LBS | HEART RATE: 66 BPM

## 2018-10-18 DIAGNOSIS — Z11.59 NEED FOR HEPATITIS C SCREENING TEST: ICD-10-CM

## 2018-10-18 DIAGNOSIS — Z11.4 SCREENING FOR HIV (HUMAN IMMUNODEFICIENCY VIRUS): ICD-10-CM

## 2018-10-18 DIAGNOSIS — Z11.59 NEED FOR HEPATITIS B SCREENING TEST: ICD-10-CM

## 2018-10-18 DIAGNOSIS — M54.2 NECK PAIN: Primary | ICD-10-CM

## 2018-10-18 DIAGNOSIS — E55.9 VITAMIN D DEFICIENCY: ICD-10-CM

## 2018-10-18 PROCEDURE — 86803 HEPATITIS C AB TEST: CPT | Performed by: FAMILY MEDICINE

## 2018-10-18 PROCEDURE — 99213 OFFICE O/P EST LOW 20 MIN: CPT | Performed by: FAMILY MEDICINE

## 2018-10-18 PROCEDURE — 82306 VITAMIN D 25 HYDROXY: CPT | Performed by: FAMILY MEDICINE

## 2018-10-18 PROCEDURE — 87389 HIV-1 AG W/HIV-1&-2 AB AG IA: CPT | Performed by: FAMILY MEDICINE

## 2018-10-18 PROCEDURE — 87340 HEPATITIS B SURFACE AG IA: CPT | Performed by: FAMILY MEDICINE

## 2018-10-18 PROCEDURE — 36415 COLL VENOUS BLD VENIPUNCTURE: CPT | Performed by: FAMILY MEDICINE

## 2018-10-18 ASSESSMENT — PAIN SCALES - GENERAL: PAINLEVEL: SEVERE PAIN (6)

## 2018-10-18 NOTE — LETTER
October 23, 2018    Oskar Genao  [address]  [place] [address]            Dear Oskar,    The results of your recent tests were negative.  Below is a copy of the results.  It was a pleasure to see you at your last appointment.    If you have any questions or concerns, please call myself or my nurse at [phone].    Sincerely,    Torsten Amor MD    Results for orders placed or performed in visit on 10/18/18   Hepatitis C antibody   Result Value Ref Range    Hepatitis C Antibody Nonreactive NR^Nonreactive   Hepatitis B surface antigen   Result Value Ref Range    Hep B Surface Agn Nonreactive NR^Nonreactive   HIV Antigen Antibody Combo   Result Value Ref Range    HIV Antigen Antibody Combo Nonreactive NR^Nonreactive       Vitamin D Deficiency   Result Value Ref Range    Vitamin D Deficiency screening 22 20 - 75 ug/L

## 2018-10-18 NOTE — PROGRESS NOTES
"CHIEF COMPLAINT    Pain below right ear for 5 days.      HISTORY    Patient has a tender area inferior to right ear.  Pain radiated down into the side of his neck.  Symptoms have been present for about 5 days.  He started taking ibuprofen frequently and this seems to have helped over the last couple of days.  He does wear hearing aids.  He was concerned about possible infection.    Also patient works as a  in [employer].  He wanted to establish that he did not have some conditions so lab work was requested.      Patient Active Problem List   Diagnosis     Hearing loss     Seasonal allergies     Lateral epicondylitis (tennis elbow), left     Hyperlipidemia LDL goal <130     Microscopic hematuria       No current outpatient prescriptions on file.       REVIEW OF SYSTEMS    No fever.  No head congestion or sore throat.  No cough or S OB.  No chest pain or abdominal pain.  No rashes.      Past Medical History:   Diagnosis Date     Hearing loss        EXAM  /74  Pulse 66  Temp 97.6  F (36.4  C) (Oral)  Resp 14  Ht 5' 10\" (1.778 m)  Wt 194 lb 3.2 oz (88.1 kg)  SpO2 98%  BMI 27.86 kg/m2    Exam of right and left ear canals and tympanic membranes are normal.  Neck.  Tenderness at the insertion of the sternocleidomastoid muscle is noted.  No redness or swelling.  No lymphadenopathy.  Skin negative for rash.      (M54.2) Neck pain  (primary encounter diagnosis)  Comment:     I believe he has more of a muscle insertion pain and has been treating it properly with ibuprofen and massage.  Plan: Probably best to continue present measures and anticipate improvement.  Follow-up if not improving.    (Z11.4) Screening for HIV (human immunodeficiency virus)  Comment:   Plan: HIV Antigen Antibody Combo            (Z11.59) Need for hepatitis B screening test  Comment:   Plan: Hepatitis B surface antigen            (Z11.59) Need for hepatitis C screening test  Comment:   Plan: Hepatitis C antibody      "       (E55.9) Vitamin D deficiency  Comment:   Plan: Vitamin D Deficiency

## 2018-10-18 NOTE — MR AVS SNAPSHOT
"              After Visit Summary   10/18/2018    Oskar Genao    MRN: [id number]           Patient Information     Date Of Birth          1980        Visit Information        Provider Department      10/18/2018 12:40 PM Torsten Amor MD [facility]        Today's Diagnoses     Neck pain    -  1    Screening for HIV (human immunodeficiency virus)        Need for hepatitis B screening test        Need for hepatitis C screening test        Vitamin D deficiency           Follow-ups after your visit        Who to contact     If you have questions or need follow up information about today's clinic visit or your schedule please contact [facility] directly at [phone].  Normal or non-critical lab and imaging results will be communicated to you by MyChart, letter or phone within 4 business days after the clinic has received the results. If you do not hear from us within 7 days, please contact the clinic through [facility] or phone. If you have a critical or abnormal lab result, we will notify you by phone as soon as possible.  Submit refill requests through [employer] or call your pharmacy and they will forward the refill request to us. Please allow 3 business days for your refill to be completed.          Additional Information About Your Visit        MyChart Information     [employer] lets you send messages to your doctor, view your test results, renew your prescriptions, schedule appointments and more. To sign up, go to [email or web address] . Click on \"Log in\" on the left side of the screen, which will take you to the Welcome page. Then click on \"Sign up Now\" on the right side of the page.     You will be asked to enter the access code listed below, as well as some personal information. Please follow the directions to create your username and password.     Your access code is: [email or web address]  Expires: 2019  1:11 PM     Your access code will  in 90 days. If you need help or a " "new code, please call your [place] clinic or [phone].        Care EveryWhere ID     This is your Care EveryWhere ID. This could be used by other organizations to access your [place] medical records  [id number]        Your Vitals Were     Pulse Temperature Respirations Height Pulse Oximetry BMI (Body Mass Index)    66 97.6  F (36.4  C) (Oral) 14 5' 10\" (1.778 m) 98% 27.86 kg/m2       Blood Pressure from Last 3 Encounters:   10/18/18 119/74   05/08/18 111/80   09/05/17 114/79    Weight from Last 3 Encounters:   10/18/18 194 lb 3.2 oz (88.1 kg)   05/08/18 203 lb (92.1 kg)   09/19/17 202 lb 12.8 oz (92 kg)              We Performed the Following     Hepatitis B surface antigen     Hepatitis C antibody     HIV Antigen Antibody Combo     Vitamin D Deficiency        Primary Care Provider Office Phone # Fax #    Jeremie Vizcaino PA-C [phone] [address] [facility] [address]        Equal Access to Services     [facility] : Hadii aad ku hadasho Soomaali, waaxda luqadaha, qaybta kaalmada adejoseyada, lexii lundberg . So [facility] [phone].    ATENCIÓN: Si habla español, tiene a perez disposición servicios gratuitos de asistencia lingüística. [facility] [phone].    We comply with applicable federal civil rights laws and [place] laws. We do not discriminate on the basis of race, color, national origin, age, disability, sex, sexual orientation, or gender identity.            Thank you!     Thank you for choosing [facility]  for your care. Our goal is always to provide you with excellent care. Hearing back from our patients is one way we can continue to improve our services. Please take a few minutes to complete the written survey that you may receive in the mail after your visit with us. Thank you!             Your Updated Medication List - Protect others around you: Learn how to safely use, store and throw away your medicines at " www.disposemymeds.org.          This list is accurate as of 10/18/18  1:11 PM.  Always use your most recent med list.                   Brand Name Dispense Instructions for use Diagnosis    cetirizine 10 MG tablet    zyrTEC     Take 10 mg by mouth daily        FLONASE NA           polyethylene glycol 400 0.25 % Soln ophthalmic solution    BLINK TEARS     Place 1 drop into both eyes 2 times daily as needed for dry eyes    Irritation of right eye

## 2018-10-19 LAB
DEPRECATED CALCIDIOL+CALCIFEROL SERPL-MC: 22 UG/L (ref 20–75)
HBV SURFACE AG SERPL QL IA: NONREACTIVE
HCV AB SERPL QL IA: NONREACTIVE
HIV 1+2 AB+HIV1 P24 AG SERPL QL IA: NONREACTIVE

## 2019-07-25 ENCOUNTER — OFFICE VISIT (OUTPATIENT)
Dept: FAMILY MEDICINE | Facility: CLINIC | Age: 39
End: 2019-07-25
Payer: COMMERCIAL

## 2019-07-25 VITALS
DIASTOLIC BLOOD PRESSURE: 72 MMHG | BODY MASS INDEX: 24.77 KG/M2 | OXYGEN SATURATION: 99 % | TEMPERATURE: 98.6 F | WEIGHT: 173 LBS | HEART RATE: 56 BPM | HEIGHT: 70 IN | SYSTOLIC BLOOD PRESSURE: 112 MMHG

## 2019-07-25 DIAGNOSIS — L72.9 SCALP CYST: Primary | ICD-10-CM

## 2019-07-25 PROCEDURE — 99213 OFFICE O/P EST LOW 20 MIN: CPT | Performed by: FAMILY MEDICINE

## 2019-07-25 ASSESSMENT — MIFFLIN-ST. JEOR: SCORE: 1710.97

## 2019-07-25 NOTE — PATIENT INSTRUCTIONS
Please schedule a 30 minute procedure appointment to have the lesion(s) removed. Do not take any aspirin, ibuprofen, naproxen or non steroidal anti-inflammatory drug(s) for 3 day(s) prior as these can caused increased bleeding during the procedure. Tylenol is ok.

## 2019-07-25 NOTE — PROGRESS NOTES
"SUBJECTIVE:  Oskar Genao is a 39 year old male who scheduled an appointment to discuss the following issues:    He has noticed a lump on the right parietal scalp probably about 10 years ago     It gets snagged when he damon his hair     He has a similar but smaller lesion on the left side. That one is not symptomatic at this time.      Past Medical, social, family histories, medications, and allergies reviewed and updated   ROS: other than that noted above all other review of systems was negative    ROS:     No current outpatient medications on file.    OBJECTIVE:  /72   Pulse 56   Temp 98.6  F (37  C) (Oral)   Ht 1.778 m (5' 10\")   Wt 78.5 kg (173 lb)   SpO2 99%   BMI 24.82 kg/m      EXAM:  GENERAL APPEARANCE: healthy, alert and no distress    HENT: on the right parietal scalp there was a mobile subcutaneous round mass. It was non tender and the overlying skin was normal   On the left there was a similar lesion but only about 3 mm in diameter       ASSESSMENT/PLAN:    Subcutaneous cyst on the right parietal scalp. Pilar versus sebaceous          Patient Instructions   Please schedule a 30 minute procedure appointment to have the lesion(s) removed. Do not take any aspirin, ibuprofen, naproxen or non steroidal anti-inflammatory drug(s) for 3 day(s) prior as these can caused increased bleeding during the procedure. Tylenol is ok.    my  helped the patient schedule the procedure before he left/     "

## 2019-08-28 ENCOUNTER — OFFICE VISIT (OUTPATIENT)
Dept: FAMILY MEDICINE | Facility: CLINIC | Age: 39
End: 2019-08-28
Payer: COMMERCIAL

## 2019-08-28 VITALS
WEIGHT: 175 LBS | HEART RATE: 64 BPM | BODY MASS INDEX: 25.11 KG/M2 | DIASTOLIC BLOOD PRESSURE: 80 MMHG | SYSTOLIC BLOOD PRESSURE: 124 MMHG | OXYGEN SATURATION: 96 % | TEMPERATURE: 97.9 F | RESPIRATION RATE: 18 BRPM

## 2019-08-28 DIAGNOSIS — L72.3 SEBACEOUS CYST: Primary | ICD-10-CM

## 2019-08-28 PROCEDURE — 11422 EXC H-F-NK-SP B9+MARG 1.1-2: CPT | Mod: 51 | Performed by: FAMILY MEDICINE

## 2019-08-28 PROCEDURE — 12031 INTMD RPR S/A/T/EXT 2.5 CM/<: CPT | Performed by: FAMILY MEDICINE

## 2019-08-28 PROCEDURE — 99207 ZZC DROP WITH A PROCEDURE: CPT | Mod: 25 | Performed by: FAMILY MEDICINE

## 2019-08-28 ASSESSMENT — PAIN SCALES - GENERAL: PAINLEVEL: NO PAIN (0)

## 2019-08-28 NOTE — PROGRESS NOTES
Subjective: Oskra Genao is a 39 year old male who presents today for removal of a/several lesion(s). I refer you to the details concerning this/these lesion(s) in the previous note.This note was reviewed today.     The risks and benefits of the procedure were discussed. The risk include bleeding, infection, discomfort and permanent scarring. The consent form was signed and sent to be scanned into the electronic medical record.    Objective/Procedure note: The lesion is located on the right parietal scalp   and measures 15  by  15 mm. The area(s) in question was/were wiped with an alcohol pad and anesthetized using 2 percent lidocaine with epinephrine. After several minutes the area was cleansed with Betadine. A sterile field was created using a fenestrated drape and the lesion was placed in the fenestration.   The skin overlying the center of the lesion was incised. The lesion was identified and the tissue around it was dissected with the spreading action of a sharp pointed scissors. The lesion was removed in 2 pieces(s). I had a white fragile cyst wall and white caseous material inside.   The deep layer was closed using 2 simple interrupted suture(s) with buried knots of 4-0 vicryl. The superficial layer was closed using 3 simple interrupted sutures of 4-O Ethilon. The wound was dressed with antibiotic ointment and a bandage by the medial assistant. The procedure was well tolerated without complications.    Assessment: sebaceous cyst    Plan: The specimen was not sent for pathology . The patient was instructed to keep the wound moist with antibiotic ointment and covered with a bandage. The patient was instructed to remove the bandage daily before showers and then to redress as done previously. Patient was instructed to avoid submersion of the wound and to be alert for any signs of cutaneous infection. The patient was instructed to follow up in 10 days for suture removal with the RN.

## 2019-08-28 NOTE — LETTER
"                                                                          Affirmation of Informed Consent for Surgery or Invasive Procedure    1.  I, (print patient's name) Oskar Genao,  1980,   a.  Agree that I will have (include both the medical term and patient words):           Chief Complaint   Patient presents with     Derm Problem     cyst removal     Health Maintenance     PHQ2, Physical      b.  At [facility].     c.  The reason for this procedure is (medical condition):  Cyst removal head.   d.  This will be done or supervised by:  Henry Martinez MD.   e.  My doctor may have help from others.  Help could include opening or closing         the wound. Help might also include taking grafts, cutting out tissue,                           implanting devices.  I have been told who will help, if known.     2.  I have talked to my doctor or health care team about:   a.  What the procedure is and what will happen.   b   How it may help me (the benefits).   c.  How it may harm me (the most likely and most serious risks).   d.  The long-term effects the procedure might have.    e.  My other choices for treatment.  The risks and benefits of those choices.    f.   What will likely happen if I say \"no\" to this procedure.    g.  How I might feel right after and how quickly I might be expected to recover.      h.  What medicines will be used to manage pain or sedate me.     3.  I agree that:  (If I do not agree with a statement, I have crossed it out and              initialed next to it.)       a.  I will ask questions.     b.  No one has promised me definite results.    c.  If serious problems are found during the procedure, the treatment may                    change.   d.  If I have \"do not resuscitate\" (DNR) wishes, I have discussed this with my                              doctor and they will be put on hold during the procedure.   e. Students and other appropriate people, approved by the " facility, may watch                      the procedure and help with tasks they are qualified for.                                                    f.  Pictures or videos may be taken. They may be used for medical or                  educational reasons only.       g. Tissues or organs removed from my body as part of the normal course of the                    procedure may be used for research or teaching purposes. They will be                  disposed of with respect.                    h.  If a staff person is exposed to my blood or body fluids, my blood will be drawn                   and tested for HIV and hepatitis.  I understand that by law, the test results will                    go:         -  In my medical record.                         -  To the Employee Occupational Health Service and/or Infection Control                                  at this facility.    -  To the [place] [facility] officials.      i.  I may have a blood transfusion: I have talked to my doctor or care team about:    -  Why I may need a blood transfusion.     - The risks, benefits, and side effects of transfusion - and the risks of not        Having one.     -  Blood safety and other options for treatment.        Consent for blood transfusion obtained during a hospital admission is valid for the entire hospital stay.  Consent  for blood transfusion obtained in the clinic setting is valid for a year from the signature date.                                4.  I understand that:   a.  I can change my mind.  If I do, I must tell my doctor or team as soon as                           possible.              b.  The team members may change during the procedure.                c.   The team will double-check who I am.  They will ask me what I am having                         done and the site of the site of the procedure.  This is done for my safety.    My questions have been answered.  I agree to the procedure.  I have made my special  needs and instructions known.      Oskar Genao      8/28/2019 2:05 PM  Patient (or representative)/Relationship to patient             Date  Time      Witness:  I have verified that the signature is that of the patient or patient's representative and that this has been signed before the procedure:    NAME:        8/28/2019  2:05 PM         Date  Time  Person verifying patient's name or patient representative's signature     Provider:  I have discussed the procedure and the information stated above with the patient (or the patient's representative) and answered their questions. The patient or their representative consented to the procedure:      Henry Martinez MD    8/28/2019  2:05 PM  Physician or Provider's Signature(s)   Date  Time       Intepreter (if used):       8/28/2019  2:05 PM                                   Name       Language/Organization Date  Time    Consent for procedure valid for 30 days after patient signature date     [facility]  AFFIRMATION OF INFORMED CONSENT FOR SURGERY OR INVASIVE PROCEDURE               Original - Medical Records

## 2019-08-29 ENCOUNTER — TELEPHONE (OUTPATIENT)
Dept: FAMILY MEDICINE | Facility: CLINIC | Age: 39
End: 2019-08-29

## 2019-08-29 NOTE — TELEPHONE ENCOUNTER
Lm message for patient to return my call. We need to reschedule suture removal for 10 days out. Scheduled for the 3rd right now that is not leaving them in long enough. Needs to reschedule for Sept 9th.

## 2019-08-30 NOTE — TELEPHONE ENCOUNTER
Spoke with patient and gave him message below. He rescheduled for Sept 11th. He states this is the only day he can come in because he works every other day. He is asking if it is okay to leave the sutures in that long. Sutures placed Aug. 28th. Will route to provider to advise.  Salma Duke, CMA

## 2019-08-30 NOTE — TELEPHONE ENCOUNTER
Yes it is ok to leave in longer. May cause slightly more scarring but it is not a big deal.   Henry Martinez MD

## 2019-09-09 ENCOUNTER — ALLIED HEALTH/NURSE VISIT (OUTPATIENT)
Dept: NURSING | Facility: CLINIC | Age: 39
End: 2019-09-09
Payer: COMMERCIAL

## 2019-09-09 DIAGNOSIS — Z48.02 VISIT FOR SUTURE REMOVAL: Primary | ICD-10-CM

## 2019-09-09 PROCEDURE — 99207 ZZC NO CHARGE NURSE ONLY: CPT

## 2019-09-09 NOTE — NURSING NOTE
Oskar Genao presents to the clinic today for removal of sutures.  The patient has had the sutures in place for 12 days.  There has been no history of infection or drainage.  3 sutures are seen located on the right parietal scalp.  The wound is healing well with no signs of infection.  Tetanus status is up to date.   All sutures were easily removed today.  Routine wound care discussed.  The patient will follow up as needed. Shantel Sagastume RN

## 2019-10-04 ENCOUNTER — TELEPHONE (OUTPATIENT)
Dept: FAMILY MEDICINE | Facility: CLINIC | Age: 39
End: 2019-10-04

## 2019-10-04 NOTE — TELEPHONE ENCOUNTER
Spoke with patient, instructed needs a visit for prescription  Due to needing whole family seen and treated, advised patient to do [facility] virtual visit for treatment     Lianna BARN, RN, CPN

## 2019-10-04 NOTE — TELEPHONE ENCOUNTER
Patient states the whole household has pink eye. Could he possible get a prescription.  Ok to leave a message.  Thank You.

## 2019-12-03 ENCOUNTER — OFFICE VISIT (OUTPATIENT)
Dept: FAMILY MEDICINE | Facility: CLINIC | Age: 39
End: 2019-12-03
Payer: COMMERCIAL

## 2019-12-03 VITALS
WEIGHT: 177 LBS | RESPIRATION RATE: 16 BRPM | DIASTOLIC BLOOD PRESSURE: 71 MMHG | SYSTOLIC BLOOD PRESSURE: 127 MMHG | BODY MASS INDEX: 25.4 KG/M2 | HEART RATE: 70 BPM | TEMPERATURE: 97.9 F | OXYGEN SATURATION: 97 %

## 2019-12-03 DIAGNOSIS — J11.1 INFLUENZA-LIKE ILLNESS: Primary | ICD-10-CM

## 2019-12-03 PROCEDURE — 99213 OFFICE O/P EST LOW 20 MIN: CPT | Performed by: INTERNAL MEDICINE

## 2019-12-03 NOTE — LETTER
December 3, 2019      Oskar Genao  [address] [place] [address]        To Whom It May Concern:    Oskar Genao was seen in our clinic. He missed work on 12/1, 12/2, and 12/3/2019 due to illness.  He may return to work without restrictions.      Sincerely,        Becky Mcmillan MD

## 2019-12-17 ENCOUNTER — TELEPHONE (OUTPATIENT)
Dept: URGENT CARE | Facility: URGENT CARE | Age: 39
End: 2019-12-17

## 2019-12-17 NOTE — TELEPHONE ENCOUNTER
Reason for Call:  Form, our goal is to have forms completed with 72 hours, however, some forms may require a visit or additional information.    Type of letter, form or note:  leave of absence    Who is the form from?: Patient    Where did the form come from: Patient or family brought in       What clinic location was the form placed at?: [place]    Where the form was placed: Given to MA/RN    What number is listed as a contact on the form?: [phone]       Additional comments: after you fax it out, patient would like call that it's completed    Call taken on 12/17/2019 at 4:48 PM by Immanuel Neumann

## 2019-12-18 NOTE — TELEPHONE ENCOUNTER
Patient phoned back. I let him know the forms were completed however, his signature was not on the completed forms for me to fax as he requested. He said that was fine and that he would stop in to the clinic to pick the forms at the , [facility]. They have been placed there for .  Apurva CRUZ

## 2019-12-30 NOTE — TELEPHONE ENCOUNTER
form was picked up from  by SELF. ID was checked and patient  label was attached to patient  log.    English

## 2020-03-10 ENCOUNTER — HEALTH MAINTENANCE LETTER (OUTPATIENT)
Age: 40
End: 2020-03-10

## 2020-08-26 ENCOUNTER — OFFICE VISIT (OUTPATIENT)
Dept: FAMILY MEDICINE | Facility: CLINIC | Age: 40
End: 2020-08-26
Payer: OTHER MISCELLANEOUS

## 2020-08-26 VITALS
DIASTOLIC BLOOD PRESSURE: 75 MMHG | RESPIRATION RATE: 14 BRPM | BODY MASS INDEX: 25.54 KG/M2 | WEIGHT: 178 LBS | HEART RATE: 64 BPM | TEMPERATURE: 98.1 F | SYSTOLIC BLOOD PRESSURE: 123 MMHG

## 2020-08-26 DIAGNOSIS — S16.1XXD STRAIN OF NECK MUSCLE, SUBSEQUENT ENCOUNTER: ICD-10-CM

## 2020-08-26 DIAGNOSIS — S06.0X0D CONCUSSION WITHOUT LOSS OF CONSCIOUSNESS, SUBSEQUENT ENCOUNTER: Primary | ICD-10-CM

## 2020-08-26 PROCEDURE — 99214 OFFICE O/P EST MOD 30 MIN: CPT | Performed by: PHYSICIAN ASSISTANT

## 2020-08-26 RX ORDER — ONDANSETRON 4 MG/1
4 TABLET, ORALLY DISINTEGRATING ORAL
COMMUNITY
Start: 2020-08-24 | End: 2020-11-21

## 2020-08-26 RX ORDER — CYCLOBENZAPRINE HCL 10 MG
10 TABLET ORAL 2 TIMES DAILY PRN
Qty: 30 TABLET | Refills: 0 | Status: SHIPPED | OUTPATIENT
Start: 2020-08-26 | End: 2020-11-21
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
## 2020-08-26 NOTE — LETTER
[facility]  [address] [place] [address]  Phone: [phone]    August 26, 2020        Oskar Genao  [address] [place] [address]          To whom it may concern:    RE: Oskar Genao    Patient was seen and treated today at our clinic due to a concussion sustained at work.  He may return to work on Saturday 8/29/2020 with the following restrictions:  Light duty (no contact with residents)  Shifts no longer than 6 hours at at time.  Allow for breaks as needed.      Restrictions are effective for 1 month (9/26/20) and he plans to follow-up with the concussion clinic for further evaluation.     Please contact me for questions or concerns.      Sincerely,        Sara Ramirez PA-C

## 2020-09-03 ENCOUNTER — OFFICE VISIT (OUTPATIENT)
Dept: ORTHOPEDICS | Facility: CLINIC | Age: 40
End: 2020-09-03
Payer: OTHER MISCELLANEOUS

## 2020-09-03 VITALS
BODY MASS INDEX: 25.78 KG/M2 | DIASTOLIC BLOOD PRESSURE: 83 MMHG | SYSTOLIC BLOOD PRESSURE: 119 MMHG | HEART RATE: 65 BPM | WEIGHT: 179.7 LBS

## 2020-09-03 DIAGNOSIS — S06.0X0A CONCUSSION WITHOUT LOSS OF CONSCIOUSNESS, INITIAL ENCOUNTER: Primary | ICD-10-CM

## 2020-09-03 PROCEDURE — 99204 OFFICE O/P NEW MOD 45 MIN: CPT | Performed by: PHYSICAL MEDICINE & REHABILITATION
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
## 2020-09-03 NOTE — PATIENT INSTRUCTIONS
Today's Plan of Care:  -Avoid intense physical activity, activities with the risk of falling, or contact sports. Okay to do light walking.  -Limit screen time: computers, iPads, cell phones, video games, TV  -Rest physically and cognitively. Avoid things that worsen symptoms.  -Work: continue light duty, may increase to 12 hours per day. Letter provided.  -Chiropractor care as desired  -Also discussed physical therapy      Follow Up: 2 weeks or sooner if symptoms fail to improve or worsen. Please call with any questions or concerns.       Healing After a Concussion     Watch symptoms closely  After a concussion, you may have a headache, stomach upset, motion sickness, personality changes or feel confused or dizzy.    Each day, write down any symptoms you have along with how often it occurs, how long it lasts and what makes it better or worse. This log will help your doctor see how well you are healing.    Rest  Rest is the best treatment for a concussion. You should avoid activities that cause your symptoms to get worse or make you feel tired. This would include physical activities as well as watching TV, texting or playing video games.    Don t nap during the day. If you do nap, make sure it is for less than an hour and takes place before 3 p.m.    If you find it is hard to fall asleep, talk to your doctor.    You do not need to be awakened during the night, unless your doctor tells you otherwise.    Treating pain  It is best to avoid taking medicine, but if needed, you may take Tylenol (acetaminophen). Follow the directions on the label. If you cannot manage your pain with Tylenol, call your doctor or go to the emergency room.    Do not take other over-the-counter pain relievers (ibuprofen, Advil, Motrin, Aleve) If you find it is hard to fall asleep, talk to your doctor.    Do not take medicines to help you sleep (Benadryl, Tylenol PM). They may cause new problems.    Returning to activity  Doing light non-contact  "physical activity (walking or stationary biking) has been shown to help with recovery, as long as there is no risk of re-injury. Some tips to keep in mind:    Keep the level of exercise light so that you don t aggravate or increase your concussion symptoms.    Take your time returning to activity. A doctor can help determine the activity level that is best for you.    See a healthcare provider before returning to a sport. They can help guide you through a safe process for returning to play.    Returning to school or work  You can rest your brain by staying at home for a time. The length of time you stay away from school or work will depend on the injury and symptoms. Often it is no more than 1 to 2 full days.    Once you are back, stay away from activities that increase your symptoms. This may mean changing your routine, avoiding noise and asking for more time to complete tests and projects.    Your doctor can help you create a plan for the conditions at your job and can work with your school to help you succeed.      If you have questions, call:  During business hours  (Monday through Friday, 6:30 a.m. - 5 p.m.)  Concussion  (appointments): [phone]  After hours, weekends and holidays  Athletic Medicine hotline: [phone]          For informational purposes only.  Not to replace the advice of your health care provider.  Copyright   2014 [facility].  All rights reserved.    Clinically reviewed by the [employer]. PLTech 141879 - Rev 06/20.             Sleep Hygiene     What is it?    \"Sleep hygiene\" means having good sleep habits. Follow the tips below to sleep better at night.      Get on a schedule. Go to bed and get up at about the same time every day.    Listen to your body. Only try to sleep when you actually feel tired or sleepy.    Be patient. If you haven't been able to get to sleep after about 20 minutes or more, get up and do something calming or boring " "until you feel sleepy. Then, return to bed and try again.      Avoid caffeine (coffee, tea, cola drinks, chocolate and some medicines) for at least 4 to 6 hours before going to bed. We also suggest you don't use alcohol or nicotine (cigarettes) during this time. Both can make it harder for you to fall asleep and stay asleep.    Use your bed for sleeping only. That means no TV, computer or homework in bed!    Don't nap during the day. If you do nap, make sure it is for less than an hour and before 3 p.m.    Create sleep rituals that remind your body that it is time to sleep. Examples include breathing exercises, stretching, or reading a book.     Try a bath or shower before bed. Having a hot bath 1 to 2 hours before bedtime can help you feel sleepy.    Don't watch the clock. Checking the clock during the night can wake you up. It can also lead to negative thoughts such as \"I will never fall asleep.\"    Use a sleep diary. Track your sleep schedule to know your sleep patterns and to see where you can improve.    Get regular exercise. But try not to do heavy exercise in the 4 hours before bedtime.      Eat a healthy, balanced diet. Try eating a light, healthy snack before bed, but avoid eating a heavy meal.    Create the right sleeping area. A cool, dark, quiet room is best. If needed, try earplugs, fans and blackout curtains.      Keep your daytime routine the same even if you have a bad night sleep. Avoiding activities the next day can make it harder to sleep.          For informational purposes only. Not to replace the advice of your health care provider. Copyright   2013 [facility]. All rights reserved.    "

## 2020-09-03 NOTE — LETTER
[facility]  [address] [place] [address]  Phone: [phone]    September 3, 2020        Oskar Genao  [address] [place] [address]          To whom it may concern:    RE: Oskar Genao    Patient was seen and treated today at our clinic due to a concussion sustained at work.  He may continue working with the following restrictions:  Light duty (no contact with residents)  Shifts no longer than 12 hours at at time.  Allow for breaks as needed.      Restrictions are effective for 2 weeks (9/17/20). He will be seen in follow up in 2 weeks for re-evaluation    Please contact me for questions or concerns.      Sincerely,        Maribel Dumont MD

## 2020-09-11 ENCOUNTER — TELEPHONE (OUTPATIENT)
Dept: ORTHOPEDICS | Facility: CLINIC | Age: 40
End: 2020-09-11

## 2020-09-11 NOTE — LETTER
[facility]  [address] [place] [address]  Phone: [phone]    September 11, 2020        Oskar Genao  [address] [place] [address]          To whom it may concern:    RE: Oskar Genao    Patient was seen and treated today at our clinic due to a concussion sustained at work.  He may continue working with the following restrictions:  Light duty (no contact with residents)  Shifts no longer than 12 hours at at time.  Allow for breaks as needed.   Please allow him to wear a hat and/or sunglasses during work     Restrictions are effective for 2 weeks (9/17/20). He will be seen in follow up on 9/17 with Dr. Ponce    Please contact me for questions or concerns.      Sincerely,        Maribel Dumont MD

## 2020-09-11 NOTE — TELEPHONE ENCOUNTER
Patient LVM stating he had an appointment about one week ago and would like a call back to discuss getting a note.     # [phone]

## 2020-09-11 NOTE — TELEPHONE ENCOUNTER
Spoke with patient. He would like the letter to also include wearing a hat to work as he is having trouble with the bright lights overhead. He is unable to turn them off.     New letter written with anticipation to review restrictions on 9/17 with Dr. Ponce.     He would like it emailed (informed unsecure) to [email or web address]    Evelina Castro M.Ed., LAT, ATC  Clinic Coordinator for Dr. Mariana Dumont

## 2020-09-17 ENCOUNTER — OFFICE VISIT (OUTPATIENT)
Dept: ORTHOPEDICS | Facility: CLINIC | Age: 40
End: 2020-09-17
Payer: OTHER MISCELLANEOUS

## 2020-09-17 VITALS — BODY MASS INDEX: 25.05 KG/M2 | WEIGHT: 175 LBS | HEIGHT: 70 IN

## 2020-09-17 DIAGNOSIS — S06.0X0A CONCUSSION WITHOUT LOSS OF CONSCIOUSNESS, INITIAL ENCOUNTER: Primary | ICD-10-CM

## 2020-09-17 PROCEDURE — 99214 OFFICE O/P EST MOD 30 MIN: CPT | Performed by: FAMILY MEDICINE

## 2020-09-17 ASSESSMENT — MIFFLIN-ST. JEOR: SCORE: 1715.04
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
## 2020-09-17 NOTE — PATIENT INSTRUCTIONS
Diagnosis: Concussion  Image Findings: none today  Treatment: Referral to PT and OT  Job: Letter written today  Medications: Limited tylenol/ibuprofen for pain for 1-2 weeks  Follow-up: 3 weeks    Please call [phone]   Ask for my team if you have any questions or concerns    It was great seeing you again today!    Tristan Ponce MD, CAQSM    Supplements for Concussion    Fish oil/Omega 3 1000mg 2 tabs ONCE DAILY  Zinc 30mg ONCE DAILY    To help reduce HEADACHES:  Coenzyme Q10 100mg TWICE DAILY  Riboflavin/Vitamin B2 400mg ONCE DAILY or 200mg TWICE DAILY  Magnesium oxide 100-400mg TWICE DAILY    To help with INSOMNIA:  Melatonin 3-5mg AT BEDTIME    Other:  Alpha Lipoic Acid 200mg TWICE DAILY  N-Acetyl Cysteine (NAC) 1200mg TWICE DAILY for 7 days  Curcumin/Turmeric 500mg TWICE DAILY

## 2020-09-17 NOTE — PROGRESS NOTES
ASSESSMENT & PLAN  Concussion without loss of consciousness, initial encounter    Notable after a head injury while at work on 8/24/20 at work.  Pt having mild paracervical muscle TTP with significantly pos VOMS testing as well as difficulty with memory.  Counseled patient on nature of condition and treatment options.  Given this plan as below, follow-up 3 weeks for repeat evaluation.  PLAN:  Hx of concussion: None  Modifiers: work related  Concussion Rehab ordered: PT/OT, letter written for work today cont restrictions  Follow up in 3 weeks      Tristan Ponce MD      ---    SUBJECTIVE:  Oskar Genao is a 40 year old male who returns for follow-up evaluation of a concussion that occurred on 8/24/2020.  Last visit was on 9/3/2020 with Dr. Dumont.  Pt has bright lights at work helps with wearing a hat.  Pt was doing 3 day week with lights progressively worsening after that.  Pt still having light sensitivity.  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  Pt wearing blue light filter sunglasses uncertain if they help.      Since last visit, worse balance light sensitivity when returned to 12 hour shifts    Since your last visit, level of activity is: Stage 1 - very light.     Since your last visit, have you continued with your normal cognitive activity (text, computer, school):  Normal ADLS    Sleep: No Issues, sleeping less than usual and difficulties staying asleep    Symptoms at this visit:  CONCUSSION SYMPTOMS ASSESSMENT 9/3/2020 9/17/2020   Headache or Pressure In Head 0 - none 2 - mild to moderate   Upset Stomach or Throwing Up 0 - none 1 - mild   Problems with Balance 1 - mild 5 - severe   Feeling Dizzy 0 - none 3 - moderate   Sensitivity to Light 3 - moderate 5 - severe   Sensitivity to Noise 3 - moderate 3 - moderate   Mood Changes 2 - mild to moderate 4 - moderate to severe   Feeling sluggish, hazy, or foggy 0 - none 4 - moderate to severe    Trouble Concentrating, Lack of Focus 1 - mild 4 - moderate to severe   Motion Sickness 0 - none 1 - mild   Vision Changes 0 - none 5 - severe   Memory Problems 1 - mild 3 - moderate   Feeling Confused 0 - none 3 - moderate   Neck Pain 4 - moderate to severe 5 - severe   Trouble Sleeping 0 - none 4 - moderate to severe   Total Number of Symptoms 7 15   Symptom Severity Score 15 52     Coordination:       - Finger to Nose: normal       - Heel to Shin: normal       - Rapid Alternating Movements: normal    Balance Testing:       - Romberg: abnormal       - Backward Tandem: abnormal       - Single-leg stance: abnormal    Head Still eyes move side to side: no nystagmus, headache, dizziness and nausea  Head still eyes move up and down: no nystagmus, headache, dizziness and nausea  Eyes fixed head moves side to side: no nystagmus, headache, dizziness and nausea  Eyes fixed, head moves up and down: no nystagmus, headache, dizziness and nausea    Walk in hallway at normal speed: Able       Past pertinent history:  Patient's past medical, surgical, social, and family histories are reviewed today and no changes are noted.    Convergence Testing: Normal (</= 6 cm)    Cognitive:  Immediate object recall (baby, monkey, perfume, sunset):      Alternate: candle, paper, sugar, sandwich, wagon     Alternate: finger, kemal, blanket, lemon, insect  4 Object Recall at 5 minutes:  Reverse months of the year:   Spell world backwards: Able  Backwards number strin numbers   4-9-3                  Alternate:  6-2-9   3-8-1-4   3-2-7-9    6-2-9-7-1   1-5-2-8-6    7-1-8-4-6-2   5-3-9-1-4-8        Time spent in one-on-one evaluation and discussion with patient regarding nature of problem, course, prior treatments, and therapeutic options, at least 50% of which was spent in counseling and coordination of care:  30 minutes including time spent in administration, interpretation, analysis and discussion of the role of IMPACT testing  for both baseline, post-injury and return to unrestricted athletic activity.    Tristan Ponce MD, CAM  [facility]

## 2020-09-21 ENCOUNTER — TELEPHONE (OUTPATIENT)
Dept: ORTHOPEDICS | Facility: CLINIC | Age: 40
End: 2020-09-21

## 2020-09-21 DIAGNOSIS — S06.0X0A CONCUSSION WITHOUT LOSS OF CONSCIOUSNESS, INITIAL ENCOUNTER: Primary | ICD-10-CM

## 2020-09-21 NOTE — TELEPHONE ENCOUNTER
LVM for Nia stating that patient is ok to schedule at [facility]. Clinic number provided if she has any further questions.    Loren Lee, ATC

## 2020-10-03 ENCOUNTER — OFFICE VISIT (OUTPATIENT)
Dept: ORTHOPEDICS | Facility: CLINIC | Age: 40
End: 2020-10-03
Payer: OTHER MISCELLANEOUS

## 2020-10-03 VITALS
BODY MASS INDEX: 25.05 KG/M2 | WEIGHT: 175 LBS | DIASTOLIC BLOOD PRESSURE: 81 MMHG | SYSTOLIC BLOOD PRESSURE: 121 MMHG | HEIGHT: 70 IN

## 2020-10-03 DIAGNOSIS — S06.0X0D CONCUSSION WITHOUT LOSS OF CONSCIOUSNESS, SUBSEQUENT ENCOUNTER: Primary | ICD-10-CM

## 2020-10-03 PROCEDURE — 99213 OFFICE O/P EST LOW 20 MIN: CPT | Performed by: PHYSICAL MEDICINE & REHABILITATION

## 2020-10-03 ASSESSMENT — MIFFLIN-ST. JEOR: SCORE: 1715.04

## 2020-10-03 NOTE — PROGRESS NOTES
"Sports Medicine Clinic Report:    CC: Head Injury    Oskar Genao is a 40 year old male who presents in follow up for a concussion that occurred on 8/24/2020 or 5.5 weeks ago.  Since last visit on 9/17/2020 patient notes improvement since his visit with Dr. Ponce.    He notes he nearly fears back to normal until he is in bright light. He continues to have light sensitivity. He notes watering of the eyes, feeling like they are dry and other symptoms follow.     First day he went back to work after his visit on 9/3 went \"okay.\"  He notes the the light sensitivity gradually got worse and then he got dizziness and an increase in symptoms. Since his visit with Dr. Ponce he has been put on 6 hour days. He notes improvement after that. He notes the memory issues are better. He notes the dizziness and headaches stopped after beginning physical therapy.     He notes the last day that he worked was the first day that he didn't feel exhausted at the end of the day or have burning in the eyes. He notes before he would have to take breaks from looking at the monitors. He feels like he is just now tolerating the 6 hour shifts without an increase in symptoms.    He is continuing to do physical therapy and occupational therapy.     Since your last visit, level of activity is:  Stage 2 - light to moderate.    Since your last visit, have you continued with your normal cognitive activity (text, computer, school):  Continues to have some forgetfulness.       Current Symptoms:  CONCUSSION SYMPTOMS ASSESSMENT 9/3/2020 9/17/2020 10/3/2020   Headache or Pressure In Head 0 - none 2 - mild to moderate 1 - mild   Upset Stomach or Throwing Up 0 - none 1 - mild 0 - none   Problems with Balance 1 - mild 5 - severe 0 - none   Feeling Dizzy 0 - none 3 - moderate 0 - none   Sensitivity to Light 3 - moderate 5 - severe 5 - severe   Sensitivity to Noise 3 - moderate 3 - moderate 3 - moderate   Mood Changes 2 - mild to moderate 4 - moderate " "to severe 1 - mild   Feeling sluggish, hazy, or foggy 0 - none 4 - moderate to severe 1 - mild   Trouble Concentrating, Lack of Focus 1 - mild 4 - moderate to severe 1 - mild   Motion Sickness 0 - none 1 - mild 0 - none   Vision Changes 0 - none 5 - severe 0 - none   Memory Problems 1 - mild 3 - moderate 1 - mild   Feeling Confused 0 - none 3 - moderate 0 - none   Neck Pain 4 - moderate to severe 5 - severe 3 - moderate   Trouble Sleeping 0 - none 4 - moderate to severe 3 - moderate   Total Number of Symptoms 7 15 9   Symptom Severity Score 15 52 19       Sleep: sleeping less than (usual 3-5 hours of sleep)    Patient's past medical, surgical, social and family histories are reviewed today.    Past Medical History:   Diagnosis Date     Hearing loss      No past surgical history on file.    OBJECTIVE:  /81   Ht 1.778 m (5' 10\")   Wt 79.4 kg (175 lb)   BMI 25.11 kg/m      General: Healthy, well-appearing, and in no acute distress.  Skin: no suspicious lesions or rashes  Eyes:  Wearing glasses for light sensitivity.    Psych: mentation appears normal, and affect is appropriate/bright  Head:  Normocephalic, atraumatic  Neuro:  Conversing appropriately      Cognitive:  Immediate object recall: 4/4  4 Object Recall at 5 minutes:3/4      Impact Testing Scores: ImPACT Testing not performed    ASSESSMENT:  Concussion without loss of consciousness, subsequent encounter    PLAN:  Image Findings: None today  Treatment: Continue PT and OT  Job: Letter written today - increase to 9 hours per day.  Restrictions provided.  Medications: Limited Tylenol/ibuprofen for pain   Supplements for concussion provided    Follow-up: 3 weeks    Mariana Dumont MD, [facility] Sports Medicine  [facility]    "

## 2020-10-03 NOTE — PATIENT INSTRUCTIONS
Diagnosis: Concussion  Image Findings: None today  Treatment: Continue PT and OT  Job: Letter written today - increase to 9 hours per day.  Medications: Limited Tylenol/ibuprofen for pain   Follow-up: 3 weeks    Please call [phone]   Ask for my team if you have any questions or concerns    It was great seeing you again today!    Mariana Dumont MD, CAQSM    Supplements for Concussion    Fish oil/Omega 3 1000mg 2 tabs ONCE DAILY  Zinc 30mg ONCE DAILY    To help reduce HEADACHES:  Coenzyme Q10 100mg TWICE DAILY  Riboflavin/Vitamin B2 400mg ONCE DAILY or 200mg TWICE DAILY  Magnesium oxide 100-400mg TWICE DAILY    To help with INSOMNIA:  Melatonin 3-5mg AT BEDTIME    Other:  Alpha Lipoic Acid 200mg TWICE DAILY  N-Acetyl Cysteine (NAC) 1200mg TWICE DAILY for 7 days  Curcumin/Turmeric 500mg TWICE DAILY

## 2020-10-03 NOTE — LETTER
[facility]  [address] [place] [address]  Phone: [phone]    10/3/2020        Oskar Genao  [address] [place] [address]          To whom it may concern:    RE: Oskar Genao    Patient was seen and treated today at our clinic due to a concussion sustained at work.  He may continue working with the following restrictions:  Light duty (no contact with residents)  Shifts no longer than 9 hours at at time.  Allow for breaks as needed for worsening symptoms.   Please allow him to wear a hat and/or sunglasses during work.       Restrictions are effective for 3 weeks.   We will follow up in 3 weeks.      Please contact me for questions or concerns.      Sincerely,        Mariana Dumont MD

## 2020-10-03 NOTE — LETTER
"    10/3/2020         RE: Oskar Genao  [address] [place] [address]        Dear Colleague,    Thank you for referring your patient, Oskar Genao, to the [facility] [place]. Please see a copy of my visit note below.    Sports Medicine Clinic Report:    CC: Head Injury    Oskar Genao is a 40 year old male who presents in follow up for a concussion that occurred on 8/24/2020 or 5.5 weeks ago.  Since last visit on 9/17/2020 patient notes improvement since his visit with Dr. Ponce.    He notes he nearly fears back to normal until he is in bright light. He continues to have light sensitivity. He notes watering of the eyes, feeling like they are dry and other symptoms follow.     First day he went back to work after his visit on 9/3 went \"okay.\"  He notes the the light sensitivity gradually got worse and then he got dizziness and an increase in symptoms. Since his visit with Dr. Ponce he has been put on 6 hour days. He notes improvement after that. He notes the memory issues are better. He notes the dizziness and headaches stopped after beginning physical therapy.     He notes the last day that he worked was the first day that he didn't feel exhausted at the end of the day or have burning in the eyes. He notes before he would have to take breaks from looking at the monitors. He feels like he is just now tolerating the 6 hour shifts without an increase in symptoms.    He is continuing to do physical therapy and occupational therapy.     Since your last visit, level of activity is:  Stage 2 - light to moderate.    Since your last visit, have you continued with your normal cognitive activity (text, computer, school):  Continues to have some forgetfulness.       Current Symptoms:  CONCUSSION SYMPTOMS ASSESSMENT 9/3/2020 9/17/2020 10/3/2020   Headache or Pressure In Head 0 - none 2 - mild to moderate 1 - mild   Upset Stomach or Throwing Up 0 - none 1 - mild 0 - none " "  Problems with Balance 1 - mild 5 - severe 0 - none   Feeling Dizzy 0 - none 3 - moderate 0 - none   Sensitivity to Light 3 - moderate 5 - severe 5 - severe   Sensitivity to Noise 3 - moderate 3 - moderate 3 - moderate   Mood Changes 2 - mild to moderate 4 - moderate to severe 1 - mild   Feeling sluggish, hazy, or foggy 0 - none 4 - moderate to severe 1 - mild   Trouble Concentrating, Lack of Focus 1 - mild 4 - moderate to severe 1 - mild   Motion Sickness 0 - none 1 - mild 0 - none   Vision Changes 0 - none 5 - severe 0 - none   Memory Problems 1 - mild 3 - moderate 1 - mild   Feeling Confused 0 - none 3 - moderate 0 - none   Neck Pain 4 - moderate to severe 5 - severe 3 - moderate   Trouble Sleeping 0 - none 4 - moderate to severe 3 - moderate   Total Number of Symptoms 7 15 9   Symptom Severity Score 15 52 19       Sleep: sleeping less than (usual 3-5 hours of sleep)    Patient's past medical, surgical, social and family histories are reviewed today.    Past Medical History:   Diagnosis Date     Hearing loss      No past surgical history on file.    OBJECTIVE:  /81   Ht 1.778 m (5' 10\")   Wt 79.4 kg (175 lb)   BMI 25.11 kg/m      General: Healthy, well-appearing, and in no acute distress.  Skin: no suspicious lesions or rashes  Eyes:  Wearing glasses for light sensitivity.    Psych: mentation appears normal, and affect is appropriate/bright  Head:  Normocephalic, atraumatic  Neuro:  Conversing appropriately      Cognitive:  Immediate object recall: 4/4  4 Object Recall at 5 minutes:3/4      Impact Testing Scores: ImPACT Testing not performed    ASSESSMENT:  Concussion without loss of consciousness, subsequent encounter    PLAN:  Image Findings: None today  Treatment: Continue PT and OT  Job: Letter written today - increase to 9 hours per day.  Restrictions provided.  Medications: Limited Tylenol/ibuprofen for pain   Supplements for concussion provided    Follow-up: 3 weeks    Mariana Dumont MD, CA Sports " Medicine  [facility]        Again, thank you for allowing me to participate in the care of your patient.        Sincerely,        Maribel Dumont MD

## 2020-10-05 ENCOUNTER — TELEPHONE (OUTPATIENT)
Dept: ORTHOPEDICS | Facility: CLINIC | Age: 40
End: 2020-10-05

## 2020-10-05 NOTE — TELEPHONE ENCOUNTER
Called and spoke with ISAAC Kramer. Patient was scheduled for virtual visit with Dr. Dumont on 10/23/20.    Loren Lee ATC

## 2020-10-05 NOTE — TELEPHONE ENCOUNTER
Patient's [facility]. Would like to know who Oskar should f/u with. Reports Dr Dumont will not be back to the clinic Oskar is seen at for 5 weeks but is supposed to f/u in 3 weeks. Wondering if Dr Ponce will be back in clinic in 3 weeks or if they are to see another provider. Would like a call back.

## 2020-10-06 ENCOUNTER — TRANSFERRED RECORDS (OUTPATIENT)
Dept: HEALTH INFORMATION MANAGEMENT | Facility: CLINIC | Age: 40
End: 2020-10-06

## 2020-10-09 ENCOUNTER — TRANSFERRED RECORDS (OUTPATIENT)
Dept: HEALTH INFORMATION MANAGEMENT | Facility: CLINIC | Age: 40
End: 2020-10-09

## 2020-10-20 NOTE — PROGRESS NOTES
"Oskar Genao is a 40 year old male who is being evaluated via a billable video visit.      The patient has been notified of following:     \"This video visit will be conducted via a call between you and your physician/provider. We have found that certain health care needs can be provided without the need for an in-person physical exam.  This service lets us provide the care you need with a video conversation.  If a prescription is necessary we can send it directly to your pharmacy.  If lab work is needed we can place an order for that and you can then stop by our lab to have the test done at a later time.    Video visits are billed at different rates depending on your insurance coverage.  Please reach out to your insurance provider with any questions.    If during the course of the call the physician/provider feels a video visit is not appropriate, you will not be charged for this service.\"    Patient has given verbal consent for Video visit? Yes  How would you like to obtain your AVS? Mail a copy [email or web address] discussed with patient sending via email may not be completely secure and he said this is OK and would prefer email  If you are dropped from the video visit, the video invite should be resent to: Text to cell phone: [phone]  Will anyone else be joining your video visit? No        Chief Complaint   Patient presents with     Concussion        Oskar Genao is a 40 year old male who is seen in follow up for a concussion. Since last visit on 10/3/2020, Oskar has been doing \"fine\" with most sx resolved unless he sees bright light. He has to take a break at work every 3 hours due to bright lights.  He rates the neck pain at a 4/10 currently.  Symptoms are relieved with chiropractor, ibu PRN.  Symptoms are worsened by bright lights, focus very hard on a difficult task >1 hour.       He feels like he is at about 95% of his baseline.      Last work restrictions written: increase to 9 hours per day.    - " Now ~ 2 months from initial injury       CONCUSSION SYMPTOMS ASSESSMENT 9/17/2020 10/3/2020 10/23/2020   Headache or Pressure In Head 2 - mild to moderate 1 - mild 2 - mild to moderate   Upset Stomach or Throwing Up 1 - mild 0 - none 0 - none   Problems with Balance 5 - severe 0 - none 1 - mild   Feeling Dizzy 3 - moderate 0 - none 0 - none   Sensitivity to Light 5 - severe 5 - severe 5 - severe   Sensitivity to Noise 3 - moderate 3 - moderate 2 - mild to moderate   Mood Changes 4 - moderate to severe 1 - mild 0 - none   Feeling sluggish, hazy, or foggy 4 - moderate to severe 1 - mild 0 - none   Trouble Concentrating, Lack of Focus 4 - moderate to severe 1 - mild 1 - mild   Motion Sickness 1 - mild 0 - none 0 - none   Vision Changes 5 - severe 0 - none 0 - none   Memory Problems 3 - moderate 1 - mild 1 - mild   Feeling Confused 3 - moderate 0 - none 0 - none   Neck Pain 5 - severe 3 - moderate 4 - moderate to severe   Trouble Sleeping 4 - moderate to severe 3 - moderate 5 - severe   Total Number of Symptoms 15 9 8   Symptom Severity Score 52 19 21       EXAM (VIDEO):  GENERAL: healthy, alert and no distress  EYES: Eyes grossly normal to inspection, conjunctivae and sclerae normal  RESP: no audible wheeze, cough, or visible cyanosis.  No visible retractions or increased work of breathing.  Able to speak fully in complete sentences.  NEURO: Cranial nerves grossly intact, mentation intact and speech normal  PSYCH: mentation appears normal, affect normal/bright, judgement and insight intact, normal speech and appearance well-groomed      Diagnosis: Concussion  Image Findings: None today  Treatment: Completed physical and occupational therapy and transitioned to home treatment.    Job: Letter written today - increase to 12 hours per day.    Follow-up:  11/21/2020 with Dr. Dumont at [facility].    Mariana Dumont MD, [facility] Sports Medicine  [facility]        Video-Visit Details    Type of service:   Video Visit    Video Start/End time: 1:23-1:28 PM     Was having a hard timing hearing him so we switched to a telephone conversation.  Phone call duration 9 minutes.       Originating Location (pt. Location): Home    Distant Location (provider location):  [facility] SPORTS MEDICINE [facility]     Platform used for Video Visit: Rosario Dumont MD

## 2020-10-23 ENCOUNTER — TELEPHONE (OUTPATIENT)
Dept: ORTHOPEDICS | Facility: CLINIC | Age: 40
End: 2020-10-23

## 2020-10-23 ENCOUNTER — VIRTUAL VISIT (OUTPATIENT)
Dept: ORTHOPEDICS | Facility: CLINIC | Age: 40
End: 2020-10-23
Payer: COMMERCIAL

## 2020-10-23 ENCOUNTER — TELEPHONE (OUTPATIENT)
Dept: ORTHOPEDICS | Facility: OTHER | Age: 40
End: 2020-10-23

## 2020-10-23 DIAGNOSIS — S06.0X0D CONCUSSION WITHOUT LOSS OF CONSCIOUSNESS, SUBSEQUENT ENCOUNTER: Primary | ICD-10-CM

## 2020-10-23 PROCEDURE — 99214 OFFICE O/P EST MOD 30 MIN: CPT | Mod: 95 | Performed by: PHYSICAL MEDICINE & REHABILITATION

## 2020-10-23 NOTE — TELEPHONE ENCOUNTER
Patient DIVYA requesting his work letter be faxed to his [employer]  Fax# [phone]      States the [employer] link did not work and locked him out.     Please call him to notify him that the nessage was received and/or completed. # [phone]    Would like this work letter today.

## 2020-10-23 NOTE — TELEPHONE ENCOUNTER
Reason for Call:  Other appointment    Detailed comments: Work comp adjuister calling to get work ability faxed To [employer] @ [phone]    Phone Number Patient can be reached at: Other phone number:  [phone]  Best Time: any    Can we leave a detailed message on this number? Not Applicable    Call taken on 10/23/2020 at 3:53 PM by Chloé Granda

## 2020-10-23 NOTE — PATIENT INSTRUCTIONS
Diagnosis: Concussion  Image Findings: None today  Treatment: Completed physical and occupational therapy and transitioned to home treatment.    Job: Letter written today - increase to 12 hours per day.    Follow up 11/21/2020 at [facility] with Dr. Dumont.

## 2020-10-23 NOTE — LETTER
[facility]  [address] [place] [address]  Phone: [phone]    10/23/2020        Oskar Genao  [address] [place] [address]          To whom it may concern:    RE: Oskar Genao    Patient was seen and treated today at our clinic due to a concussion sustained at work.  He may continue working with the following restrictions:  Full duties.  Shifts no longer than 12 hours at a time.  Allow for breaks as needed for worsening symptoms.   Please allow him to wear a hat and/or sunglasses during work.       Restrictions are effective for ~ 1 month.   We will follow up in ~ 1 month.      Please contact me for questions or concerns.      Sincerely,        Mariana Dumont MD

## 2020-10-23 NOTE — TELEPHONE ENCOUNTER
Confirmed with patient his email, [email or web address], where he would like his AVS and work restrictions sent. Informed him we can't ensure complete security sending this way and he was OK with it. Email sent PHI SECURE. No further questions.    Keshav Corado, ATC

## 2020-10-23 NOTE — LETTER
"    10/23/2020         RE: Oskar Genao  [address]  [place] [address]        Dear Colleague,    Thank you for referring your patient, Oskar Genao, to the [facility]TS MEDICINE [facility]IN[facility] [place]. Please see a copy of my visit note below.    Oskar Genao is a 40 year old male who is being evaluated via a billable video visit.      The patient has been notified of following:     \"This video visit will be conducted via a call between you and your physician/provider. We have found that certain health care needs can be provided without the need for an in-person physical exam.  This service lets us provide the care you need with a video conversation.  If a prescription is necessary we can send it directly to your pharmacy.  If lab work is needed we can place an order for that and you can then stop by our lab to have the test done at a later time.    Video visits are billed at different rates depending on your insurance coverage.  Please reach out to your insurance provider with any questions.    If during the course of the call the physician/provider feels a video visit is not appropriate, you will not be charged for this service.\"    Patient has given verbal consent for Video visit? Yes  How would you like to obtain your AVS? Mail a copy [email or web address] discussed with patient sending via email may not be completely secure and he said this is OK and would prefer email  If you are dropped from the video visit, the video invite should be resent to: Text to cell phone: [phone]  Will anyone else be joining your video visit? No        Chief Complaint   Patient presents with     Concussion        Oskar Genao is a 40 year old male who is seen in follow up for a concussion. Since last visit on 10/3/2020, Oskar has been doing \"fine\" with most sx resolved unless he sees bright light. He has to take a break at work every 3 hours due to bright lights.  He rates the neck pain at a 4/10 " currently.  Symptoms are relieved with chiropractor, ibu PRN.  Symptoms are worsened by bright lights, focus very hard on a difficult task >1 hour.       He feels like he is at about 95% of his baseline.      Last work restrictions written: increase to 9 hours per day.    - Now ~ 2 months from initial injury       CONCUSSION SYMPTOMS ASSESSMENT 9/17/2020 10/3/2020 10/23/2020   Headache or Pressure In Head 2 - mild to moderate 1 - mild 2 - mild to moderate   Upset Stomach or Throwing Up 1 - mild 0 - none 0 - none   Problems with Balance 5 - severe 0 - none 1 - mild   Feeling Dizzy 3 - moderate 0 - none 0 - none   Sensitivity to Light 5 - severe 5 - severe 5 - severe   Sensitivity to Noise 3 - moderate 3 - moderate 2 - mild to moderate   Mood Changes 4 - moderate to severe 1 - mild 0 - none   Feeling sluggish, hazy, or foggy 4 - moderate to severe 1 - mild 0 - none   Trouble Concentrating, Lack of Focus 4 - moderate to severe 1 - mild 1 - mild   Motion Sickness 1 - mild 0 - none 0 - none   Vision Changes 5 - severe 0 - none 0 - none   Memory Problems 3 - moderate 1 - mild 1 - mild   Feeling Confused 3 - moderate 0 - none 0 - none   Neck Pain 5 - severe 3 - moderate 4 - moderate to severe   Trouble Sleeping 4 - moderate to severe 3 - moderate 5 - severe   Total Number of Symptoms 15 9 8   Symptom Severity Score 52 19 21       EXAM (VIDEO):  GENERAL: healthy, alert and no distress  EYES: Eyes grossly normal to inspection, conjunctivae and sclerae normal  RESP: no audible wheeze, cough, or visible cyanosis.  No visible retractions or increased work of breathing.  Able to speak fully in complete sentences.  NEURO: Cranial nerves grossly intact, mentation intact and speech normal  PSYCH: mentation appears normal, affect normal/bright, judgement and insight intact, normal speech and appearance well-groomed      Diagnosis: Concussion  Image Findings: None today  Treatment: Completed physical and occupational therapy and  transitioned to home treatment.    Job: Letter written today - increase to 12 hours per day.    Follow-up:  11/21/2020 with Dr. Dumont at [facility].    Mariana Dumont MD, [facility] Sports Medicine  [facility]        Video-Visit Details    Type of service:  Video Visit    Video Start/End time: 1:23-1:28 PM     Was having a hard timing hearing him so we switched to a telephone conversation.  Phone call duration 9 minutes.       Originating Location (pt. Location): Home    Distant Location (provider location):  [facility] SPORTS MEDICINE [facility]     Platform used for Video Visit: Rosario Dumont MD            Again, thank you for allowing me to participate in the care of your patient.        Sincerely,        Maribel Dumont MD

## 2020-11-21 ENCOUNTER — VIRTUAL VISIT (OUTPATIENT)
Dept: ORTHOPEDICS | Facility: CLINIC | Age: 40
End: 2020-11-21
Payer: OTHER MISCELLANEOUS

## 2020-11-21 DIAGNOSIS — S06.0X0D CONCUSSION WITHOUT LOSS OF CONSCIOUSNESS, SUBSEQUENT ENCOUNTER: Primary | ICD-10-CM

## 2020-11-21 PROCEDURE — 99212 OFFICE O/P EST SF 10 MIN: CPT | Mod: 95 | Performed by: PHYSICAL MEDICINE & REHABILITATION

## 2020-11-21 RX ORDER — IBUPROFEN 200 MG
400 TABLET ORAL EVERY 4 HOURS PRN
COMMUNITY
End: 2021-09-15

## 2020-11-21 NOTE — PROGRESS NOTES
"Oskar Genao is a 40 year old male who is being evaluated via a billable telephone visit.      The patient has been notified of following:     \"This telephone visit will be conducted via a call between you and your physician/provider. We have found that certain health care needs can be provided without the need for a physical exam.  This service lets us provide the care you need with a short phone conversation.  If a prescription is necessary we can send it directly to your pharmacy.  If lab work is needed we can place an order for that and you can then stop by our lab to have the test done at a later time.    Telephone visits are billed at different rates depending on your insurance coverage. During this emergency period, for some insurers they may be billed the same as an in-person visit.  Please reach out to your insurance provider with any questions.    If during the course of the call the physician/provider feels a telephone visit is not appropriate, you will not be charged for this service.\"    Patient has given verbal consent for Telephone visit?  Yes    What phone number would you like to be contacted at? [phone]    How would you like to obtain your AVS? MyChart      Chief Complaint   Patient presents with     Concussion        Oskar Genao is a 40 year old male who is seen in follow up for a concussion.  Since last visit on 10/23/2020, Edgardo has transitioned to 12 hour shifts at work.  He notes these were \"rough at the start\" but are \"smooth sailing\" now. He notes the light sensitivity has improved, however, it is still there. He notes the neck pain (base of the skull and sides of the neck) is the same, 3-4/10 most days.  He notes the last few days the neck pain has been a 6/10.  He has not been to the chiropractor for some time as the chiropractor was out on quarantine.  He had a massage and the neck pain worsened.  Symptoms are relieved with chiropractor, ibu PRN.  Symptoms are worsened by " bright lights, focus very hard on a difficult task.  He reports the focus issues are not causing trouble on a daily or weekly basis just not where it was before the injury.        He feels like he is at about 90% of his baseline.          - Now ~ 3 months from initial injury    CONCUSSION SYMPTOMS ASSESSMENT 10/3/2020 10/23/2020 11/21/2020   Headache or Pressure In Head 1 - mild 2 - mild to moderate 1 - mild   Upset Stomach or Throwing Up 0 - none 0 - none 0 - none   Problems with Balance 0 - none 1 - mild 0 - none   Feeling Dizzy 0 - none 0 - none 0 - none   Sensitivity to Light 5 - severe 5 - severe 4 - moderate to severe   Sensitivity to Noise 3 - moderate 2 - mild to moderate 1 - mild   Mood Changes 1 - mild 0 - none 3 - moderate   Feeling sluggish, hazy, or foggy 1 - mild 0 - none 1 - mild   Trouble Concentrating, Lack of Focus 1 - mild 1 - mild 1 - mild   Motion Sickness 0 - none 0 - none 0 - none   Vision Changes 0 - none 0 - none 0 - none   Memory Problems 1 - mild 1 - mild 0 - none   Feeling Confused 0 - none 0 - none 1 - mild   Neck Pain 3 - moderate 4 - moderate to severe 4 - moderate to severe   Trouble Sleeping 3 - moderate 5 - severe 4 - moderate to severe   Total Number of Symptoms 9 8 9   Symptom Severity Score 19 21 20     Assessment:  Concussion without loss of consciousness, subsequent encounter    Image Findings: None today  Treatment: Completed physical and occupational therapy and transitioned to home treatment.    Job: Letter written today - increase to full hours.  Can continue to use a hat and/or sunglasses at work.      Follow up in ~ 2 months.  Possibly establish maximum medical improvement at that time.  Please call with questions or concerns.      Phone call duration: 8 minutes    Maribel Dumont MD

## 2020-11-21 NOTE — LETTER
[facility]  [address] [address] [place] [address]  Phone: [phone]    11/21/2020        Oskar Genao  [address] [place] [address]          To whom it may concern:    RE: Oskar Genao    Patient was seen and treated today at our clinic due to a concussion sustained at work.  He may continue working with the following restrictions:    -Full duties.  -Please allow him to wear a hat and/or sunglasses during work.  -No hour restrictions at this time, may return to full work hours       Restrictions are effective for ~ 2 months.   We will follow up in ~ 2 months to likely establish maximal medical improvement.      Please contact me for questions or concerns.      Sincerely,        Mariana Dumont MD

## 2020-11-21 NOTE — PATIENT INSTRUCTIONS
Diagnosis: Concussion  Image Findings: None today  Treatment: Completed physical and occupational therapy and transitioned to home treatment.    Job: Letter written today - increase to full hours.  Can continue to use a hat and/or sunglasses at work.      Follow up in ~ 2 months.  Please call with questions or concerns.

## 2020-11-21 NOTE — LETTER
"    11/21/2020         RE: Oskar Genao  [address]  [place] [address]        Dear Colleague,    Thank you for referring your patient, Oskar Genao, to the [facility]. Please see a copy of my visit note below.    Oskar Genao is a 40 year old male who is being evaluated via a billable telephone visit.      The patient has been notified of following:     \"This telephone visit will be conducted via a call between you and your physician/provider. We have found that certain health care needs can be provided without the need for a physical exam.  This service lets us provide the care you need with a short phone conversation.  If a prescription is necessary we can send it directly to your pharmacy.  If lab work is needed we can place an order for that and you can then stop by our lab to have the test done at a later time.    Telephone visits are billed at different rates depending on your insurance coverage. During this emergency period, for some insurers they may be billed the same as an in-person visit.  Please reach out to your insurance provider with any questions.    If during the course of the call the physician/provider feels a telephone visit is not appropriate, you will not be charged for this service.\"    Patient has given verbal consent for Telephone visit?  Yes    What phone number would you like to be contacted at? [phone]    How would you like to obtain your AVS? MyChart      Chief Complaint   Patient presents with     Concussion        Oskar Genao is a 40 year old male who is seen in follow up for a concussion.  Since last visit on 10/23/2020, Edgardo has transitioned to 12 hour shifts at work.  He notes these were \"rough at the start\" but are \"smooth sailing\" now. He notes the light sensitivity has improved, however, it is still there. He notes the neck pain (base of the skull and sides of the neck) is the same, 3-4/10 most days.  He notes the " last few days the neck pain has been a 6/10.  He has not been to the chiropractor for some time as the chiropractor was out on quarantine.  He had a massage and the neck pain worsened.  Symptoms are relieved with chiropractor, ibu PRN.  Symptoms are worsened by bright lights, focus very hard on a difficult task.  He reports the focus issues are not causing trouble on a daily or weekly basis just not where it was before the injury.        He feels like he is at about 90% of his baseline.          - Now ~ 3 months from initial injury    CONCUSSION SYMPTOMS ASSESSMENT 10/3/2020 10/23/2020 11/21/2020   Headache or Pressure In Head 1 - mild 2 - mild to moderate 1 - mild   Upset Stomach or Throwing Up 0 - none 0 - none 0 - none   Problems with Balance 0 - none 1 - mild 0 - none   Feeling Dizzy 0 - none 0 - none 0 - none   Sensitivity to Light 5 - severe 5 - severe 4 - moderate to severe   Sensitivity to Noise 3 - moderate 2 - mild to moderate 1 - mild   Mood Changes 1 - mild 0 - none 3 - moderate   Feeling sluggish, hazy, or foggy 1 - mild 0 - none 1 - mild   Trouble Concentrating, Lack of Focus 1 - mild 1 - mild 1 - mild   Motion Sickness 0 - none 0 - none 0 - none   Vision Changes 0 - none 0 - none 0 - none   Memory Problems 1 - mild 1 - mild 0 - none   Feeling Confused 0 - none 0 - none 1 - mild   Neck Pain 3 - moderate 4 - moderate to severe 4 - moderate to severe   Trouble Sleeping 3 - moderate 5 - severe 4 - moderate to severe   Total Number of Symptoms 9 8 9   Symptom Severity Score 19 21 20     Assessment:  Concussion without loss of consciousness, subsequent encounter    Image Findings: None today  Treatment: Completed physical and occupational therapy and transitioned to home treatment.    Job: Letter written today - increase to full hours.  Can continue to use a hat and/or sunglasses at work.      Follow up in ~ 2 months.  Possibly establish maximum medical improvement at that time.  Please call with questions  or concerns.      Phone call duration: 8 minutes    Maribel Dumont MD          Again, thank you for allowing me to participate in the care of your patient.        Sincerely,        Maribel Dumont MD

## 2020-12-11 ENCOUNTER — VIRTUAL VISIT (OUTPATIENT)
Dept: FAMILY MEDICINE | Facility: OTHER | Age: 40
End: 2020-12-11
Payer: COMMERCIAL

## 2020-12-11 PROCEDURE — 99421 OL DIG E/M SVC 5-10 MIN: CPT | Performed by: PREVENTIVE MEDICINE

## 2020-12-11 NOTE — PROGRESS NOTES
"Date: 2020 15:53:20  Clinician: Osmani Hernandez  Clinician NPI: [id number]  Patient: Oskar smith  Patient : 1980  Patient Address: [address] [place], [place] [address] [place] [address]  Patient Phone: [phone]  Visit Protocol: URI  Patient Summary:  Oskar is a 40 year old ( : 1980 ) male who initiated a [facility] Visit for COVID-19 (Coronavirus) evaluation and screening. When asked the question \"Please sign me up to receive news, health information and promotions from [facility].\", Oskar responded \"No\".    Oskar states his symptoms started gradually 3-4 days ago. After his symptoms started, they improved and then got worse again.   His symptoms consist of a headache, a cough, nasal congestion, facial pain or pressure, myalgia, chills, malaise, and a sore throat. He is experiencing mild difficulty breathing with activities but can speak normally in full sentences. Oskar also feels feverish.   Symptom details     Nasal secretions: The color of his mucus is green, white, and clear.    Cough: Oskar coughs every 5-10 minutes and his cough is not more bothersome at night. Phlegm comes into his throat when he coughs. He believes his cough is caused by post-nasal drip. The color of the phlegm is white and clear.     Sore throat: Oskar reports having mild throat pain (1-3 on a 10 point pain scale), does not have exudate on his tonsils, and can swallow liquids. The lymph nodes in his neck are not enlarged. A rash has not appeared on the skin since the sore throat started.     Temperature: His current temperature is 97.0 degrees Fahrenheit.     Facial pain or pressure: The facial pain or pressure does not feel worse when bending or leaning forward.     Headache: He states the headache is mild (1-3 on a 10 point pain scale).      Oskar denies having ear pain, wheezing, enlarged lymph nodes, nausea, vomiting, rhinitis, teeth pain, ageusia, diarrhea, and anosmia. He also denies taking antibiotic medication in the past month, " having recent facial or sinus surgery in the past 60 days, and having a sinus infection within the past year.   Precipitating events  Within the past week, Oskar has not been exposed to someone with strep throat. He has recently been exposed to someone with influenza. Oskar has been in close contact with the following high risk individuals: children under the age of 5.   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 Additional job details as reported by the patient (free text):  at [employer].   In the past 14 days, he has not lived in a congregate living setting.   Oskar has not had a close contact with a laboratory-confirmed COVID-19 patient within 14 days of symptom onset.    Since December 2019, Oskar has been tested for COVID-19 and has had upper respiratory infection (URI) or influenza-like illness.      Result of COVID-19 test: Negative     Date of his COVID-19 test: 05/12/2020     Date(s) of previous URI or influenza-like illness (free-text): 12-     Symptoms Oskar experienced during previous URI or influenza-like illness as reported by the patient (free-text): Fever, cough, fatigue.        Pertinent medical history  He has not been told by his provider to avoid NSAIDs.   Oskar does not have diabetes. He denies having immunosuppressive conditions (e.g., chemotherapy, HIV, organ transplant, long-term use of steroids or other immunosuppressive medications, splenectomy). He does not have severe COPD and congestive heart failure. He does not have asthma.   Oskar needs a return to work/school note.   Weight: 180 lbs   Oskar does not smoke or use smokeless tobacco.   Additional information as reported by the patient (free text): Wife was in close contact with several people that tested  positive for covid.   Weight: 180 lbs    MEDICATIONS: No current medications, ALLERGIES: NKDA  Clinician Response:  Dear Oskar,   Your symptoms show that you may have coronavirus (COVID-19). This illness can cause fever, cough and trouble breathing. Many people get a mild case and get better on their own. Some people can get very sick.  What should I do?  We would like to test you for this virus.   1. Please call [phone] to schedule your visit. Explain that you were referred by [facility] to have a COVID-19 test. Be ready to [facility].  * If you need to schedule in [facility] please call [phone] or for [employer] employees please call [phone].  * If you need to schedule in the [place] area please call [phone]. [employer] employees call [phone].  The following will serve as your written order for this COVID Test, ordered by me, for the indication of suspected COVID [Z20.828]: The test will be ordered in [employer], our electronic health record, after you are scheduled. It will show as ordered and authorized by Ariel Butler MD.  Order: COVID-19 (Coronavirus) PCR for SYMPTOMATIC testing from [facility].   2. When it's time for your COVID test:  Stay at least 6 feet away from others. (If someone will drive you to your test, stay in the backseat, as far away from the  as you can.)   Cover your mouth and nose with a mask, tissue or washcloth.  Go straight to the testing site. Don't make any stops on the way there or back.      3.Starting now: Stay home and away from others (self-isolate) until:   You've had no fever---and no medicine that reduces fever---for one full day (24 hours). And...   Your other symptoms have gotten better. For example, your cough or breathing has improved. And...   At least 10 days have passed since your symptoms started.       During this time, don't leave the house except for testing or medical care.   Stay in your own room, even for meals. Use your own  "bathroom if you can.   Stay away from others in your home. No hugging, kissing or shaking hands. No visitors.  Don't go to work, school or anywhere else.    Clean \"high touch\" surfaces often (doorknobs, counters, handles, etc.). Use a household cleaning spray or wipes. You'll find a full list of  on the EPA website: www.epa.gov/pesticide-registration/list-n-disinfectants-use-against-sars-cov-2.   Cover your mouth and nose with a mask, tissue or washcloth to avoid spreading germs.  Wash your hands and face often. Use soap and water.  Caregivers in these groups are at risk for severe illness due to COVID-19:  o People 65 years and older  o People who live in a nursing home or long-term care facility  o People with chronic disease (lung, heart, cancer, diabetes, kidney, liver, immunologic)  o People who have a weakened immune system, including those who:   Are in cancer treatment  Take medicine that weakens the immune system, such as corticosteroids  Had a bone marrow or organ transplant  Have an immune deficiency  Have poorly controlled HIV or AIDS  Are obese (body mass index of 40 or higher)  Smoke regularly   o Caregivers should wear gloves while washing dishes, handling laundry and cleaning bedrooms and bathrooms.  o Use caution when washing and drying laundry: Don't shake dirty laundry, and use the warmest water setting that you can.  o For more tips, go to www.cdc.gov/coronavirus/2019-ncov/downloads/10Things.pdf.    4.Sign up for [employer]. We know it's scary to hear that you might have COVID-19. We want to track your symptoms to make sure you're okay over the next 2 weeks. Please look for an email from [employer]---this is a free, online program that we'll use to keep in touch. To sign up, follow the link in the email. Learn more at [email or web address]  How can I take care of myself?   Get lots of rest. Drink extra fluids (unless a doctor has told you not to).   Take Tylenol " (acetaminophen) for fever or pain. If you have liver or kidney problems, ask your family doctor if it's okay to take Tylenol.   Adults can take either:    650 mg (two 325 mg pills) every 4 to 6 hours, or...   1,000 mg (two 500 mg pills) every 8 hours as needed.    Note: Don't take more than 3,000 mg in one day. Acetaminophen is found in many medicines (both prescribed and over-the-counter medicines). Read all labels to be sure you don't take too much.   For children, check the Tylenol bottle for the right dose. The dose is based on the child's age or weight.    If you have other health problems (like cancer, heart failure, an organ transplant or severe kidney disease): Call your specialty clinic if you don't feel better in the next 2 days.       Know when to call 911. Emergency warning signs include:    Trouble breathing or shortness of breath Pain or pressure in the chest that doesn't go away Feeling confused like you haven't felt before, or not being able to wake up Bluish-colored lips or face.  Where can I get more information?   [facility] -- About COVID-19: [email or web address]   CDC -- What to Do If You're Sick: www.cdc.gov/coronavirus/2019-ncov/about/steps-when-sick.html   CDC -- Ending Home Isolation: www.cdc.gov/coronavirus/2019-ncov/hcp/disposition-in-home-patients.html   CDC -- Caring for Someone: www.cdc.gov/coronavirus/2019-ncov/if-you-are-sick/care-for-someone.html   [facility] -- Interim Guidance for Hospital Discharge to Home: www.health.[facility].[place]/diseases/coronavirus/hcp/hospdischarge.pdf   [facility] clinical trials (COVID-19 research studies): clinicalaffairs.[facility]/umn-clinical-trials    Below are the COVID-19 hotlines at the [place] Department of Health ([facility]). Interpreters are available.    For health questions: Call 107-761-8962 or 1-662.579.5844 (7 a.m. to 7 p.m.) For questions about schools and childcare: Call 804-683-6484 or 1-987.717.8210 (7 a.m. to 7 p.m.)     Diagnosis: Cough  Diagnosis ICD: R05

## 2020-12-12 DIAGNOSIS — Z20.822 SUSPECTED COVID-19 VIRUS INFECTION: ICD-10-CM

## 2020-12-12 DIAGNOSIS — Z20.822 SUSPECTED COVID-19 VIRUS INFECTION: Primary | ICD-10-CM

## 2020-12-12 PROCEDURE — U0003 INFECTIOUS AGENT DETECTION BY NUCLEIC ACID (DNA OR RNA); SEVERE ACUTE RESPIRATORY SYNDROME CORONAVIRUS 2 (SARS-COV-2) (CORONAVIRUS DISEASE [COVID-19]), AMPLIFIED PROBE TECHNIQUE, MAKING USE OF HIGH THROUGHPUT TECHNOLOGIES AS DESCRIBED BY CMS-2020-01-R: HCPCS | Performed by: FAMILY MEDICINE

## 2020-12-13 LAB
SARS-COV-2 RNA SPEC QL NAA+PROBE: NOT DETECTED
SPECIMEN SOURCE: NORMAL

## 2020-12-27 ENCOUNTER — HEALTH MAINTENANCE LETTER (OUTPATIENT)
Age: 40
End: 2020-12-27

## 2021-04-24 ENCOUNTER — HEALTH MAINTENANCE LETTER (OUTPATIENT)
Age: 41
End: 2021-04-24

## 2021-09-15 ENCOUNTER — OFFICE VISIT (OUTPATIENT)
Dept: FAMILY MEDICINE | Facility: CLINIC | Age: 41
End: 2021-09-15
Payer: COMMERCIAL

## 2021-09-15 VITALS
DIASTOLIC BLOOD PRESSURE: 80 MMHG | RESPIRATION RATE: 16 BRPM | TEMPERATURE: 97.6 F | WEIGHT: 190 LBS | SYSTOLIC BLOOD PRESSURE: 124 MMHG | HEIGHT: 70 IN | BODY MASS INDEX: 27.2 KG/M2 | OXYGEN SATURATION: 99 % | HEART RATE: 59 BPM

## 2021-09-15 DIAGNOSIS — D17.30 LIPOMA OF SKIN AND SUBCUTANEOUS TISSUE: ICD-10-CM

## 2021-09-15 DIAGNOSIS — M25.521 RIGHT ELBOW PAIN: ICD-10-CM

## 2021-09-15 DIAGNOSIS — L98.9 SKIN LESION: Primary | ICD-10-CM

## 2021-09-15 PROCEDURE — 99213 OFFICE O/P EST LOW 20 MIN: CPT | Performed by: PHYSICIAN ASSISTANT

## 2021-09-15 ASSESSMENT — PAIN SCALES - GENERAL: PAINLEVEL: NO PAIN (1)

## 2021-09-15 ASSESSMENT — MIFFLIN-ST. JEOR: SCORE: 1778.08

## 2021-09-15 NOTE — PROGRESS NOTES
Assessment & Plan       ICD-10-CM    1. Skin lesion  L98.9    2. Lipoma of skin and subcutaneous tissue  D17.30    3. Right elbow pain  M25.521    1.  Talk to patient about his concerns.  I reassured him that this is not cancerous.  It looks like irritated cherry angioma skin lesion.  Warning signs were discussed.  At this point we will do conservative management by watching this.  I did talk about follow-up with dermatology due to the multiple moles he had for skin check he deferred for now.  2.  I suspect that his lump is a lipoma.  We talked about the benign nature of a lipoma and follow-up as needed.  3.  I suspect his elbow pain is just from overuse due to new activity.  He does not seem to be keeping him from doing his normal activities he will watch at this time.  We talked about ice and heat and ibuprofen use as needed.  We talked about activity modification.    Return in about 4 weeks (around 10/13/2021) for recheck, As Needed.    Jeremie Vizcaino PA-C  [facility]   Edgardo is a 41 year old who presents for the following health issues     HPI     Concern - derm problem  Onset: 1 month  Description: lesions on neck   Intensity: moderate  Progression of Symptoms:  Spreading   Accompanying Signs & Symptoms: itchy, bleeding after being scratched   Previous history of similar problem: none  Precipitating factors:        Worsened by: touch/scratching area  Alleviating factors:        Improved by: none  Therapies tried and outcome: None  Right elbow pain, feels a lump where pain is present off and on for about 4 wks. she has started doing [place] jujitsu classes with his son.  He states he is on the mat a lot did not know if he injured it.  He denies any numbness or tingling or locking.  He has not been do anything for it.    Review of Systems   Constitutional, HEENT, cardiovascular, pulmonary, gi and gu systems are negative, except as otherwise noted.      Objective   "  /80   Pulse 59   Temp 97.6  F (36.4  C) (Tympanic)   Resp 16   Ht 1.778 m (5' 10\")   Wt 86.2 kg (190 lb)   SpO2 99%   BMI 27.26 kg/m    Body mass index is 27.26 kg/m .  Physical Exam   GENERAL: healthy, alert and no distress  Skin: About a 4 mm raised red papule about his right sternoclavicular joint.  No active bleeding.  Multiple moles scattered throughout his neck and trunk.  Right elbow has full range of motion 5-5 strength in flexion extension.  He has about a 3 cm circular palpable mass on his medial distal biceps region.  Consistent with a cyst or lipoma.              "

## 2021-09-28 ENCOUNTER — OFFICE VISIT (OUTPATIENT)
Dept: FAMILY MEDICINE | Facility: CLINIC | Age: 41
End: 2021-09-28
Payer: COMMERCIAL

## 2021-09-28 VITALS
HEIGHT: 70 IN | OXYGEN SATURATION: 96 % | SYSTOLIC BLOOD PRESSURE: 123 MMHG | BODY MASS INDEX: 27.49 KG/M2 | WEIGHT: 192 LBS | HEART RATE: 70 BPM | RESPIRATION RATE: 16 BRPM | DIASTOLIC BLOOD PRESSURE: 81 MMHG | TEMPERATURE: 98.5 F

## 2021-09-28 DIAGNOSIS — S39.012A STRAIN OF LUMBAR REGION, INITIAL ENCOUNTER: ICD-10-CM

## 2021-09-28 DIAGNOSIS — L98.9 SKIN LESION: ICD-10-CM

## 2021-09-28 DIAGNOSIS — Z00.00 ROUTINE GENERAL MEDICAL EXAMINATION AT A HEALTH CARE FACILITY: Primary | ICD-10-CM

## 2021-09-28 LAB
ANION GAP SERPL CALCULATED.3IONS-SCNC: 3 MMOL/L (ref 3–14)
BUN SERPL-MCNC: 20 MG/DL (ref 7–30)
CALCIUM SERPL-MCNC: 9 MG/DL (ref 8.5–10.1)
CHLORIDE BLD-SCNC: 107 MMOL/L (ref 94–109)
CHOLEST SERPL-MCNC: 235 MG/DL
CO2 SERPL-SCNC: 30 MMOL/L (ref 20–32)
CREAT SERPL-MCNC: 1.09 MG/DL (ref 0.66–1.25)
FASTING STATUS PATIENT QL REPORTED: NO
GFR SERPL CREATININE-BSD FRML MDRD: 84 ML/MIN/1.73M2
GLUCOSE BLD-MCNC: 98 MG/DL (ref 70–99)
HDLC SERPL-MCNC: 39 MG/DL
LDLC SERPL CALC-MCNC: 144 MG/DL
NONHDLC SERPL-MCNC: 196 MG/DL
POTASSIUM BLD-SCNC: 4 MMOL/L (ref 3.4–5.3)
SODIUM SERPL-SCNC: 140 MMOL/L (ref 133–144)
TRIGL SERPL-MCNC: 258 MG/DL

## 2021-09-28 PROCEDURE — 80048 BASIC METABOLIC PNL TOTAL CA: CPT | Performed by: PHYSICIAN ASSISTANT

## 2021-09-28 PROCEDURE — 80061 LIPID PANEL: CPT | Performed by: PHYSICIAN ASSISTANT

## 2021-09-28 PROCEDURE — 99396 PREV VISIT EST AGE 40-64: CPT | Performed by: PHYSICIAN ASSISTANT

## 2021-09-28 PROCEDURE — 36415 COLL VENOUS BLD VENIPUNCTURE: CPT | Performed by: PHYSICIAN ASSISTANT

## 2021-09-28 ASSESSMENT — ENCOUNTER SYMPTOMS
CONSTIPATION: 0
HEMATOCHEZIA: 0
MYALGIAS: 1
FREQUENCY: 0
DIARRHEA: 0
COUGH: 0
ABDOMINAL PAIN: 0
FEVER: 0
HEMATURIA: 0
PARESTHESIAS: 0
PALPITATIONS: 0
JOINT SWELLING: 0
SORE THROAT: 0
NAUSEA: 0
DYSURIA: 0
HEADACHES: 0
DIZZINESS: 0
NERVOUS/ANXIOUS: 0
EYE PAIN: 0
CHILLS: 0
SHORTNESS OF BREATH: 0
ARTHRALGIAS: 1
HEARTBURN: 0
WEAKNESS: 0

## 2021-09-28 ASSESSMENT — MIFFLIN-ST. JEOR: SCORE: 1787.16

## 2021-09-28 ASSESSMENT — PAIN SCALES - GENERAL: PAINLEVEL: MILD PAIN (2)

## 2021-09-28 NOTE — PROGRESS NOTES
SUBJECTIVE:   CC: Oskar Genao is an 41 year old male who presents for preventative health visit.       Patient has been advised of split billing requirements and indicates understanding: Yes  Healthy Habits:     Getting at least 3 servings of Calcium per day:  Yes    Bi-annual eye exam:  NO    Dental care twice a year:  NO    Sleep apnea or symptoms of sleep apnea:  None    Diet:  Regular (no restrictions)    Frequency of exercise:  4-5 days/week    Duration of exercise:  45-60 minutes    Taking medications regularly:  Yes    Barriers to taking medications:  Problems remembering to take them    Medication side effects:  None    PHQ-2 Total Score: 0    Additional concerns today:  Yes    Low back pain - wondering if kidney related -he does participate in martial arts and states he will occasionally get thrown on his back and have a spasm for about the last about 10 seconds and then goes away and then he is able to continue to participate.  He denies numbness tingling.  He denies any weakness.  He denies any fevers chills or body aches or bowel or bladder loss.  He denies any blood in his urine.  He has not done anything for treatment.    Has a couple spots derm spots he would like removed-he states the spot around his neck is red and getting bigger.  He states he has bumped it in the past and it bleeds and it takes a long time to get it to stop bleeding sometimes even over a week.      Today's PHQ-2 Score:   PHQ-2 ( 1999 [employer]) 9/28/2021   Q1: Little interest or pleasure in doing things 0   Q2: Feeling down, depressed or hopeless 0   PHQ-2 Score 0   Q1: Little interest or pleasure in doing things Not at all   Q2: Feeling down, depressed or hopeless Not at all   PHQ-2 Score 0       Abuse: Current or Past(Physical, Sexual or Emotional)- No  Do you feel safe in your environment? Yes    Have you ever done Advance Care Planning? (For example, a Health Directive, POLST, or a discussion with a medical provider or your  loved ones about your wishes): No, advance care planning information given to patient to review.  Patient declined advance care planning discussion at this time.    Social History     Tobacco Use     Smoking status: Never Smoker     Smokeless tobacco: Never Used   Substance Use Topics     Alcohol use: No         Alcohol Use 9/28/2021   Prescreen: >3 drinks/day or >7 drinks/week? No       Last PSA: No results found for: PSA    Reviewed orders with patient. Reviewed health maintenance and updated orders accordingly - Yes  Lab work is in process  Labs reviewed in EPIC  BP Readings from Last 3 Encounters:   09/28/21 123/81   09/15/21 124/80   10/03/20 121/81    Wt Readings from Last 3 Encounters:   09/28/21 87.1 kg (192 lb)   09/15/21 86.2 kg (190 lb)   10/03/20 79.4 kg (175 lb)                  Patient Active Problem List   Diagnosis     Hearing loss     Seasonal allergies     Lateral epicondylitis (tennis elbow), left     Hyperlipidemia LDL goal <130     Microscopic hematuria     Concussion without loss of consciousness, subsequent encounter     History reviewed. No pertinent surgical history.    Social History     Tobacco Use     Smoking status: Never Smoker     Smokeless tobacco: Never Used   Substance Use Topics     Alcohol use: No     Family History   Problem Relation Age of Onset     Depression Other      Glaucoma No family hx of      Macular Degeneration No family hx of          No current outpatient medications on file.     No Known Allergies    Reviewed and updated as needed this visit by clinical staff  Tobacco  Allergies  Meds  Problems  Med Hx  Surg Hx  Fam Hx  Soc Hx          Reviewed and updated as needed this visit by Provider    Meds  Problems  Med Hx  Surg Hx  Fam Hx           Past Medical History:   Diagnosis Date     Hearing loss       History reviewed. No pertinent surgical history.    Review of Systems   Constitutional: Negative for chills and fever.   HENT: Positive for hearing loss.  "Negative for congestion, ear pain and sore throat.    Eyes: Negative for pain and visual disturbance.   Respiratory: Negative for cough and shortness of breath.    Cardiovascular: Negative for chest pain, palpitations and peripheral edema.   Gastrointestinal: Negative for abdominal pain, constipation, diarrhea, heartburn, hematochezia and nausea.   Genitourinary: Negative for discharge, dysuria, frequency, genital sores, hematuria, impotence and urgency.   Musculoskeletal: Positive for arthralgias and myalgias. Negative for joint swelling.   Skin: Negative for rash.   Neurological: Negative for dizziness, weakness, headaches and paresthesias.   Psychiatric/Behavioral: Negative for mood changes. The patient is not nervous/anxious.          OBJECTIVE:   /81   Pulse 70   Temp 98.5  F (36.9  C) (Tympanic)   Resp 16   Ht 1.778 m (5' 10\")   Wt 87.1 kg (192 lb)   SpO2 96%   BMI 27.55 kg/m      Physical Exam  GENERAL: healthy, alert and no distress  EYES: Eyes grossly normal to inspection, PERRL and conjunctivae and sclerae normal  HENT: ear canals and TM's normal, nose and mouth without ulcers or lesions  NECK: no adenopathy, no asymmetry, masses, or scars and thyroid normal to palpation  RESP: lungs clear to auscultation - no rales, rhonchi or wheezes  CV: regular rate and rhythm, normal S1 S2, no S3 or S4, no murmur, click or rub, no peripheral edema and peripheral pulses strong  ABDOMEN: soft, nontender, no hepatosplenomegaly, no masses and bowel sounds normal   (male): normal male genitalia without lesions or urethral discharge, no hernia  MS: no gross musculoskeletal defects noted, no edema  SKIN: no suspicious lesions or rashes  NEURO: Normal strength and tone, mentation intact and speech normal  PSYCH: mentation appears normal, affect normal/bright    Diagnostic Test Results:  Labs reviewed in Epic    ASSESSMENT/PLAN:       ICD-10-CM    1. Routine general medical examination at a health care facility  " "Z00.00 Lipid panel reflex to direct LDL Fasting     Basic metabolic panel  (Ca, Cl, CO2, Creat, Gluc, K, Na, BUN)   2. Skin lesion  L98.9 Adult Dermatology Referral   3. Strain of lumbar region, initial encounter  S39.012A    1. Work on Healthy diet and exercise. Getting heart rate elevated for 30 mins most days of week.  Labs pending  2. Follow up  With derm  3. ice or cold packs 20 minutes every 2-3 hrs as needed to relieve pain and swelling, for the first 2 days. Then can apply heat 20 minutes every 2-3 hrs (avoid sleeping on heating pad) there after as needed.   If you can tolerate, start non-steroidal anti-inflammatory medication like: Ibuprofen 600-800 mg three times daily or Aleve 2 tablets of over the counter strength twice a day as needed.   Tylenol can help with pain also.    Active range of motion exercises encouraged  Activity modification trying to avoid activities that cause you pain.     Patient has been advised of split billing requirements and indicates understanding:   COUNSELING:   Reviewed preventive health counseling, as reflected in patient instructions       Regular exercise       Healthy diet/nutrition       Vision screening    Estimated body mass index is 27.55 kg/m  as calculated from the following:    Height as of this encounter: 1.778 m (5' 10\").    Weight as of this encounter: 87.1 kg (192 lb).     Weight management plan: Discussed healthy diet and exercise guidelines    He reports that he has never smoked. He has never used smokeless tobacco.      Counseling Resources:  ATP IV Guidelines  Pooled Cohorts Equation Calculator  FRAX Risk Assessment  ICSI Preventive Guidelines  Dietary Guidelines for Americans, 2010  USDA's MyPlate  ASA Prophylaxis  Lung CA Screening    Jeremie Vizcaino PA-C  [facility]  "

## 2021-10-09 ENCOUNTER — HEALTH MAINTENANCE LETTER (OUTPATIENT)
Age: 41
End: 2021-10-09

## 2021-10-13 ENCOUNTER — OFFICE VISIT (OUTPATIENT)
Dept: DERMATOLOGY | Facility: CLINIC | Age: 41
End: 2021-10-13
Attending: PHYSICIAN ASSISTANT
Payer: COMMERCIAL

## 2021-10-13 VITALS — OXYGEN SATURATION: 97 % | DIASTOLIC BLOOD PRESSURE: 74 MMHG | SYSTOLIC BLOOD PRESSURE: 109 MMHG | HEART RATE: 66 BPM

## 2021-10-13 DIAGNOSIS — D23.9 DERMAL NEVUS: ICD-10-CM

## 2021-10-13 DIAGNOSIS — D18.01 ANGIOMA OF SKIN: Primary | ICD-10-CM

## 2021-10-13 DIAGNOSIS — D48.5 NEOPLASM OF UNCERTAIN BEHAVIOR OF SKIN: ICD-10-CM

## 2021-10-13 DIAGNOSIS — L98.9 SKIN LESION: ICD-10-CM

## 2021-10-13 PROCEDURE — 11103 TANGNTL BX SKIN EA SEP/ADDL: CPT | Performed by: DERMATOLOGY

## 2021-10-13 PROCEDURE — 11102 TANGNTL BX SKIN SINGLE LES: CPT | Performed by: DERMATOLOGY

## 2021-10-13 PROCEDURE — 88305 TISSUE EXAM BY PATHOLOGIST: CPT | Performed by: DERMATOLOGY

## 2021-10-13 PROCEDURE — 99203 OFFICE O/P NEW LOW 30 MIN: CPT | Mod: 25 | Performed by: DERMATOLOGY

## 2021-10-13 NOTE — LETTER
10/13/2021         RE: Oskar Genao  [address]  [place] [address]        Dear Colleague,    Thank you for referring your patient, Oskar Genao, to the [facility]. Please see a copy of my visit note below.    Oskar Genao is an extremely pleasant 41 year old year old male patient I was asked to see by MARY Nuñez here today for growing lesion on neck and brow.   .   Patient states this has been present for years.  Patient reports the following symptoms:  Bleeding on neck.  Patient reports the following previous treatments none.  These treatments did not work.  Patient reports the following modifying factors none.  Associated symptoms: none.  Patient has no other skin complaints today.  Remainder of the HPI, Meds, PMH, Allergies, FH, and SH was reviewed in chart.      Past Medical History:   Diagnosis Date     Hearing loss        History reviewed. No pertinent surgical history.     Family History   Problem Relation Age of Onset     Depression Other      Glaucoma No family hx of      Macular Degeneration No family hx of        Social History     Socioeconomic History     Marital status:      Spouse name: Not on file     Number of children: Not on file     Years of education: Not on file     Highest education level: Not on file   Occupational History     Not on file   Tobacco Use     Smoking status: Never Smoker     Smokeless tobacco: Never Used   Vaping Use     Vaping Use: Never used   Substance and Sexual Activity     Alcohol use: No     Drug use: No     Sexual activity: Yes     Partners: Female   Other Topics Concern     Parent/sibling w/ CABG, MI or angioplasty before 65F 55M? Not Asked   Social History Narrative     Not on file     Social Determinants of Health     Financial Resource Strain:      Difficulty of Paying Living Expenses:    Food Insecurity:      Worried About Running Out of Food in the Last Year:      Ran Out of Food in the Last Year:    Transportation  Needs:      Lack of Transportation (Medical):      Lack of Transportation (Non-Medical):    Physical Activity:      Days of Exercise per Week:      Minutes of Exercise per Session:    Stress:      Feeling of Stress :    Social Connections:      Frequency of Communication with Friends and Family:      Frequency of Social Gatherings with Friends and Family:      Attends Scientologist Services:      Active Member of Clubs or Organizations:      Attends Club or Organization Meetings:      Marital Status:    Intimate Partner Violence:      Fear of Current or Ex-Partner:      Emotionally Abused:      Physically Abused:      Sexually Abused:        No outpatient encounter medications on file as of 10/13/2021.     No facility-administered encounter medications on file as of 10/13/2021.             O:   NAD, WDWN, Alert & Oriented, Mood & Affect wnl, Vitals stable   Here today alone   /74   Pulse 66   SpO2 97%    General appearance normal   Vitals stable   Alert, oriented and in no acute distress     R brow 5mm flesh brown papule  R neck 1.2cm pedunculated red plaque  Red papules on trunk  Flesh colored papules on trunk     The remainder of expanded problem focused exam was normal; the following areas were examined:  , conjunctiva/lids, face, neck, , chest, digits/nails, RUE, LUE.      Eyes: Conjunctivae/lids:Normal     ENT: Lips, buccal mucosa, tongue: normal    MSK:Normal    Cardiovascular: peripheral edema none    Pulm: Breathing Normal    Lymph Nodes: No Head and Neck Lymphadenopathy     Neuro/Psych: Orientation:Alert and Orientedx3 ; Mood/Affect:normal       A/P:  1. angioma, dermal nevus  2. R brow r/o atypical nevus  TANGENTIAL BIOPSY SENT OUT:  After consent, anesthesia with LEC and prep, tangential excision performed and specimen sent out for permanent section histology.  No complications and routine wound care. Patient told to call our office in 1-2 weeks for result.      3. R neck r/o angioma  TANGENTIAL  BIOPSY SENT OUT:  After consent, anesthesia with LEC and prep, tangential excision performed and specimen sent out for permanent section histology.  No complications and routine wound care. Patient told to call our office in 1-2 weeks for result.         It was a pleasure speaking to Oskar Genao today.  Previous clinic notes and pertinent laboratory tests were reviewed prior to Oskar Genao's visit.  Nature and genetics of benign skin lesions dicussed with patient.  Signs and Symptoms of skin cancer discussed with patient.  Patient encouraged to perform monthly skin exams.  UV precautions reviewed with patient.  Return to clinic pending path         Again, thank you for allowing me to participate in the care of your patient.        Sincerely,        Emmett Avila MD

## 2021-10-13 NOTE — PATIENT INSTRUCTIONS
Wound Care Instructions     FOR SUPERFICIAL WOUNDS     [facility] [phone]    [facility] [phone]                       AFTER 24 HOURS YOU SHOULD REMOVE THE BANDAGE AND BEGIN DAILY DRESSING CHANGES AS FOLLOWS:     1) Remove Dressing.     2) Clean and dry the area with tap water using a Q-tip or sterile gauze pad.     3) Apply Vaseline, Aquaphor, Polysporin ointment or Bacitracin ointment over entire wound.  Do NOT use Neosporin ointment.     4) Cover the wound with a band-aid, or a sterile non-stick gauze pad and micropore paper tape      REPEAT THESE INSTRUCTIONS AT LEAST ONCE A DAY UNTIL THE WOUND HAS COMPLETELY HEALED.    It is an old wives tale that a wound heals better when it is exposed to air and allowed to dry out. The wound will heal faster with a better cosmetic result if it is kept moist with ointment and covered with a bandage.    **Do not let the wound dry out.**      Supplies Needed:      *Cotton tipped applicators (Q-tips)    *Polysporin Ointment or Bacitracin Ointment (NOT NEOSPORIN)    *Band-aids or non-stick gauze pads and micropore paper tape.      PATIENT INFORMATION:    During the healing process you will notice a number of changes. All wounds develop a small halo of redness surrounding the wound.  This means healing is occurring. Severe itching with extensive redness usually indicates sensitivity to the ointment or bandage tape used to dress the wound.  You should call our office if this develops.      Swelling  and/or discoloration around your surgical site is common, particularly when performed around the eye.    All wounds normally drain.  The larger the wound the more drainage there will be.  After 7-10 days, you will notice the wound beginning to shrink and new skin will begin to grow.  The wound is healed when you can see skin has formed over the entire area.  A healed wound has a healthy, shiny look to the surface and is red to dark pink in color  to normalize.  Wounds may take approximately 4-6 weeks to heal.  Larger wounds may take 6-8 weeks.  After the wound is healed you may discontinue dressing changes.    You may experience a sensation of tightness as your wound heals. This is normal and will gradually subside.    Your healed wound may be sensitive to temperature changes. This sensitivity improves with time, but if you re having a lot of discomfort, try to avoid temperature extremes.    Patients frequently experience itching after their wound appears to have healed because of the continue healing under the skin.  Plain Vaseline will help relieve the itching.        POSSIBLE COMPLICATIONS    BLEEDIN. Leave the bandage in place.  2. Use tightly rolled up gauze or a cloth to apply direct pressure over the bandage for 30  minutes.  3. Reapply pressure for an additional 30 minutes if necessary  4. Use additional gauze and tape to maintain pressure once the bleeding has stopped.  5.

## 2021-10-13 NOTE — PROGRESS NOTES
Oskar Genao is an extremely pleasant 41 year old year old male patient I was asked to see by MARY Nuñez here today for growing lesion on neck and brow.   .   Patient states this has been present for years.  Patient reports the following symptoms:  Bleeding on neck.  Patient reports the following previous treatments none.  These treatments did not work.  Patient reports the following modifying factors none.  Associated symptoms: none.  Patient has no other skin complaints today.  Remainder of the HPI, Meds, PMH, Allergies, FH, and SH was reviewed in chart.      Past Medical History:   Diagnosis Date     Hearing loss        History reviewed. No pertinent surgical history.     Family History   Problem Relation Age of Onset     Depression Other      Glaucoma No family hx of      Macular Degeneration No family hx of        Social History     Socioeconomic History     Marital status:      Spouse name: Not on file     Number of children: Not on file     Years of education: Not on file     Highest education level: Not on file   Occupational History     Not on file   Tobacco Use     Smoking status: Never Smoker     Smokeless tobacco: Never Used   Vaping Use     Vaping Use: Never used   Substance and Sexual Activity     Alcohol use: No     Drug use: No     Sexual activity: Yes     Partners: Female   Other Topics Concern     Parent/sibling w/ CABG, MI or angioplasty before 65F 55M? Not Asked   Social History Narrative     Not on file     Social Determinants of Health     Financial Resource Strain:      Difficulty of Paying Living Expenses:    Food Insecurity:      Worried About Running Out of Food in the Last Year:      Ran Out of Food in the Last Year:    Transportation Needs:      Lack of Transportation (Medical):      Lack of Transportation (Non-Medical):    Physical Activity:      Days of Exercise per Week:      Minutes of Exercise per Session:    Stress:      Feeling of Stress :    Social Connections:       Frequency of Communication with Friends and Family:      Frequency of Social Gatherings with Friends and Family:      Attends Yarsani Services:      Active Member of Clubs or Organizations:      Attends Club or Organization Meetings:      Marital Status:    Intimate Partner Violence:      Fear of Current or Ex-Partner:      Emotionally Abused:      Physically Abused:      Sexually Abused:        No outpatient encounter medications on file as of 10/13/2021.     No facility-administered encounter medications on file as of 10/13/2021.             O:   NAD, WDWN, Alert & Oriented, Mood & Affect wnl, Vitals stable   Here today alone   /74   Pulse 66   SpO2 97%    General appearance normal   Vitals stable   Alert, oriented and in no acute distress     R brow 5mm flesh brown papule  R neck 1.2cm pedunculated red plaque  Red papules on trunk  Flesh colored papules on trunk     The remainder of expanded problem focused exam was normal; the following areas were examined:  , conjunctiva/lids, face, neck, , chest, digits/nails, RUE, LUE.      Eyes: Conjunctivae/lids:Normal     ENT: Lips, buccal mucosa, tongue: normal    MSK:Normal    Cardiovascular: peripheral edema none    Pulm: Breathing Normal    Lymph Nodes: No Head and Neck Lymphadenopathy     Neuro/Psych: Orientation:Alert and Orientedx3 ; Mood/Affect:normal       A/P:  1. angioma, dermal nevus  2. R brow r/o atypical nevus  TANGENTIAL BIOPSY SENT OUT:  After consent, anesthesia with LEC and prep, tangential excision performed and specimen sent out for permanent section histology.  No complications and routine wound care. Patient told to call our office in 1-2 weeks for result.      3. R neck r/o angioma  TANGENTIAL BIOPSY SENT OUT:  After consent, anesthesia with LEC and prep, tangential excision performed and specimen sent out for permanent section histology.  No complications and routine wound care. Patient told to call our office in 1-2 weeks for result.          It was a pleasure speaking to Oskar Genao today.  Previous clinic notes and pertinent laboratory tests were reviewed prior to Oskar Genao's visit.  Nature and genetics of benign skin lesions dicussed with patient.  Signs and Symptoms of skin cancer discussed with patient.  Patient encouraged to perform monthly skin exams.  UV precautions reviewed with patient.  Return to clinic pending path

## 2021-10-18 LAB
PATH REPORT.COMMENTS IMP SPEC: NORMAL
PATH REPORT.COMMENTS IMP SPEC: NORMAL
PATH REPORT.FINAL DX SPEC: NORMAL
PATH REPORT.GROSS SPEC: NORMAL
PATH REPORT.MICROSCOPIC SPEC OTHER STN: NORMAL
PATH REPORT.RELEVANT HX SPEC: NORMAL

## 2021-10-29 ENCOUNTER — OFFICE VISIT (OUTPATIENT)
Dept: FAMILY MEDICINE | Facility: CLINIC | Age: 41
End: 2021-10-29
Payer: OTHER MISCELLANEOUS

## 2021-10-29 VITALS
SYSTOLIC BLOOD PRESSURE: 110 MMHG | OXYGEN SATURATION: 97 % | RESPIRATION RATE: 16 BRPM | HEIGHT: 70 IN | TEMPERATURE: 98.5 F | BODY MASS INDEX: 27.32 KG/M2 | WEIGHT: 190.8 LBS | HEART RATE: 80 BPM | DIASTOLIC BLOOD PRESSURE: 70 MMHG

## 2021-10-29 DIAGNOSIS — S86.819A STRAIN OF DISTAL BICEPS FEMORIS TENDON: Primary | ICD-10-CM

## 2021-10-29 PROCEDURE — 99213 OFFICE O/P EST LOW 20 MIN: CPT | Performed by: NURSE PRACTITIONER

## 2021-10-29 ASSESSMENT — MIFFLIN-ST. JEOR: SCORE: 1781.71

## 2021-10-29 NOTE — PROGRESS NOTES
"  Assessment & Plan     Strain of distal biceps femoris tendon  Exam is normal.  Paperwork filled out to return to work with no restrictions.    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   Current Status: feels good, not causing him any issues      Review of Systems   CONSTITUTIONAL: NEGATIVE for fever, chills, change in weight  MUSCULOSKELETAL: POSITIVE  for recent history of left bicep muscle strain with resolution of symptoms  PSYCHIATRIC: NEGATIVE for changes in mood or affect  ROS otherwise negative      Objective    /70   Pulse 80   Temp 98.5  F (36.9  C) (Tympanic)   Resp 16   Ht 1.778 m (5' 10\")   Wt 86.5 kg (190 lb 12.8 oz)   SpO2 97%   BMI 27.38 kg/m    Body mass index is 27.38 kg/m .  Physical Exam   GENERAL: healthy, alert and no distress  MS: normal left bicep muscle strength in flexion and extension; normal ROM of left elbow and no tenderness on palpation of elbow or bicep   PSYCH: mentation appears normal, affect normal/bright      "

## 2022-03-23 ENCOUNTER — TELEPHONE (OUTPATIENT)
Dept: FAMILY MEDICINE | Facility: CLINIC | Age: 42
End: 2022-03-23
Payer: COMMERCIAL

## 2022-03-23 NOTE — TELEPHONE ENCOUNTER
Reason for Call:  Other call back    Detailed comments: Patient wanting FMLA papers filled out. Employer is making pt work many hours. Patient works from 6pm-10am. Patient feeling he needs FMLA for mental health.Patient needs paper work filled out soon.     Phone Number Patient can be reached at: Cell number on file:    Telephone Information:   Mobile [phone]       Best Time: anytime    Can we leave a detailed message on this number? YES    Call taken on 3/23/2022 at 4:41 PM by Lindsay Ingram

## 2022-03-24 NOTE — TELEPHONE ENCOUNTER
Called and spoke to patient's wife. Patient is being required to work 80 hours a week. This is taking a toll on his mental health.     Can this be a virtual appointment? I have him scheduled for a video visit currently. He will get the forms to you.    Pau Thomas MA/ANTHONY

## 2022-03-28 NOTE — PROGRESS NOTES
Edgardo is a 41 year old who is being evaluated via a billable video visit.      How would you like to obtain your AVS? MyChart  If the video visit is dropped, the invitation should be resent by: Text to cell phone: [phone]  Will anyone else be joining your video visit? No      Video Start Time: 8:50 AM    Assessment & Plan       ICD-10-CM    1. Work-related stress  Z56.6 Adult Mental Health  Referral     Talk to patient through phone regarding his concerns at this point he will drop off FMLA forms will do this for 30 days up referral in for psychology to assist us and assessing his mental health and possibly extending his FMLA as his work situation appears to be indefinite at this time.    Return in about 4 weeks (around 4/26/2022) for recheck, As Needed.    Jeremie Vizcaino PA-C  [facility]   Edgardo is a 41 year old who presents for the following health issues   Initially set up his video visit but we were unable to connect we converted to a phone visit.  History of Present Illness       Mental Health Follow-up:                    Today's PHQ-9         PHQ-9 Total Score: 19  PHQ-9 Q9 Thoughts of better off dead/self-harm past 2 weeks :   (P) Not at all    How difficult have these problems made it for you to do your work, take care of things at home, or get along with other people: Extremely difficult        Reason for visit:  FMLA  Symptom onset:  More than a month  Symptoms include:  Exhaustion, balance issues, work balance.  Symptom intensity:  Severe  Symptom progression:  Worsening  Had these symptoms before:  No  What makes it worse:  Lack of sleep  What makes it better:  Sleep    He eats 2-3 servings of fruits and vegetables daily.He consumes 0 sweetened beverage(s) daily.He exercises with enough effort to increase his heart rate 9 or less minutes per day.  He exercises with enough effort to increase his heart rate 3 or less days per week.   He is taking  medications regularly.       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   Is having to work multiple 16 hr shifts and only sleeping on weekends.   Very on edge.     Normal shift is a 8 hr shift. Now having to work double shifts. 3-4 a week. Works 5 days a week.     Is working with a counselor for a couple months.     Works as a .     He denies showed his work is requiring him to do double shifts back to back to back and he is just stressed to the max and can handle this physically and mentally.  He states all he does is sleep and he does not have a social life and deal with his family.  Is been very stressful for him he has been having to use last couple months.  He is been working with a counselor but is not quite sure we will get a counselor it is as they recommended getting FMLA forms filled out.    Review of Systems   Constitutional, HEENT, cardiovascular, pulmonary, gi and gu systems are negative, except as otherwise noted.      Objective           Vitals:  No vitals were obtained today due to virtual visit.    Physical Exam   GENERAL: Healthy, alert and no distress  EYES: Eyes grossly normal to inspection.  No discharge or erythema, or obvious scleral/conjunctival abnormalities.  RESP: No audible wheeze, cough, or visible cyanosis.  No visible retractions or increased work of breathing.    SKIN: Visible skin clear. No significant rash, abnormal pigmentation or lesions.  NEURO: Cranial nerves grossly intact.  Mentation and speech appropriate for age.  PSYCH: Mentation appears normal, affect normal/bright, judgement and insight intact, normal speech and appearance well-groomed.          Video-Visit Details    Type of service:  Video Visit    Video End Time:9:15 AM    Originating Location (pt. Location): Home    Distant Location (provider location):  [facility] [place]     Platform used for Video Visit: [email or web address]

## 2022-03-29 ENCOUNTER — VIRTUAL VISIT (OUTPATIENT)
Dept: FAMILY MEDICINE | Facility: CLINIC | Age: 42
End: 2022-03-29
Payer: COMMERCIAL

## 2022-03-29 DIAGNOSIS — Z56.6 WORK-RELATED STRESS: Primary | ICD-10-CM

## 2022-03-29 PROCEDURE — 99214 OFFICE O/P EST MOD 30 MIN: CPT | Mod: TEL | Performed by: PHYSICIAN ASSISTANT

## 2022-03-29 SDOH — HEALTH STABILITY - MENTAL HEALTH: OTHER PHYSICAL AND MENTAL STRAIN RELATED TO WORK: Z56.6

## 2022-03-29 ASSESSMENT — PATIENT HEALTH QUESTIONNAIRE - PHQ9
SUM OF ALL RESPONSES TO PHQ QUESTIONS 1-9: 19
SUM OF ALL RESPONSES TO PHQ QUESTIONS 1-9: 19
10. IF YOU CHECKED OFF ANY PROBLEMS, HOW DIFFICULT HAVE THESE PROBLEMS MADE IT FOR YOU TO DO YOUR WORK, TAKE CARE OF THINGS AT HOME, OR GET ALONG WITH OTHER PEOPLE: EXTREMELY DIFFICULT

## 2022-03-30 ASSESSMENT — PATIENT HEALTH QUESTIONNAIRE - PHQ9: SUM OF ALL RESPONSES TO PHQ QUESTIONS 1-9: 19

## 2022-05-16 NOTE — PROGRESS NOTES
ICD-10-CM    1. Encounter related to worker's compensation claim  Z02.6    2. Acute pain of right knee  M25.561 XR Knee Right 1/2 Views   3. Fall, initial encounter  W19.XXXA XR Knee Right 1/2 Views       S/p injury at work, having knee pain-exam showing tenderness on the medial aspect of joint line and tibial plateau, normal exam otherwise  Normal xray, advised follow up in one week, brace, ice elevate and rest, light duty at work for a week        Your xray looks good  I would recommend light duty and follow up with your provider/or sports med if not better for recheck, possible re imaging.  Wear your brace as discussed  Ice /heat , tylenol and ibuprofen as needed    Angie Logan D.O.      Lida Reynoso is a 41 year old who presents for the following health issues  accompanied by his self.    History of Present Illness       Reason for visit:  Knee injury  Symptom onset:  1-3 days ago  Symptoms include:  Pain in knee  Symptom intensity:  Moderate  Symptom progression:  Staying the same  Had these symptoms before:  No  What makes it worse:  Walking or standing    He eats 2-3 servings of fruits and vegetables daily.He consumes 0 sweetened beverage(s) daily.He exercises with enough effort to increase his heart rate 60 or more minutes per day.  He exercises with enough effort to increase his heart rate 5 days per week.   He is taking medications regularly.       Pain History:  When did you first notice your pain? - Acute Pain   Have you seen anyone else for your pain? No  Where in your body do you have pain? Musculoskeletal problem/pain  Onset/Duration: occurred at work 5/14/22  Description  Location: knee - right  Joint Swelling: no  Redness: no  Pain: YES  Warmth: no  Intensity:  moderate  Progression of Symptoms:  same  Accompanying signs and symptoms:   Fevers: no  Numbness/tingling/weakness: no  History  Trauma to the area: YES- hit inside of knee on concrete  Recent illness:  no  Previous similar  "problem: no  Previous evaluation:  no  Precipitating or alleviating factors:  Aggravating factors include: standing and walking  Therapies tried and outcome: ibprofun twice, iced it once     No real swelling,   He works at a correctional facility, 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  He has been having pain and minimal swelling.   fell on his knee cap.           Review of Systems   Constitutional, HEENT, cardiovascular, pulmonary, GI, , musculoskeletal, neuro, skin, endocrine and psych systems are negative, except as otherwise noted.      Objective    /70   Pulse 60   Temp 98  F (36.7  C) (Oral)   Resp 16   Ht 1.778 m (5' 10\")   Wt 84.4 kg (186 lb)   SpO2 100%   BMI 26.69 kg/m    Body mass index is 26.69 kg/m .  Physical Exam   GENERAL: healthy, alert and no distress  NECK: no adenopathy, no asymmetry, masses, or scars and thyroid normal to palpation  RESP: lungs clear to auscultation - no rales, rhonchi or wheezes  CV: regular rate and rhythm, normal S1 S2, no S3 or S4, no murmur, click or rub, no peripheral edema and peripheral pulses strong  ABDOMEN: soft, nontender, no hepatosplenomegaly, no masses and bowel sounds normal  MS: exam showing tenderness on the medial aspect of joint line and tibial plateau, normal exam otherwise  , normal ligament test, normal strength, no gross musculoskeletal defects noted, no edema              "

## 2022-05-16 NOTE — PATIENT INSTRUCTIONS
Your xray looks good  I would recommend light duty and follow up with your provider/or sports med if not better for recheck, possible re imaging.  Wear your brace as discussed  Ice /heat , tylenol and ibuprofen as needed  Angie Logan D.O.        Patient Education

## 2022-05-17 ENCOUNTER — ANCILLARY PROCEDURE (OUTPATIENT)
Dept: GENERAL RADIOLOGY | Facility: CLINIC | Age: 42
End: 2022-05-17
Attending: FAMILY MEDICINE
Payer: OTHER MISCELLANEOUS

## 2022-05-17 ENCOUNTER — OFFICE VISIT (OUTPATIENT)
Dept: FAMILY MEDICINE | Facility: CLINIC | Age: 42
End: 2022-05-17
Payer: OTHER MISCELLANEOUS

## 2022-05-17 VITALS
WEIGHT: 186 LBS | TEMPERATURE: 98 F | SYSTOLIC BLOOD PRESSURE: 114 MMHG | BODY MASS INDEX: 26.63 KG/M2 | OXYGEN SATURATION: 100 % | DIASTOLIC BLOOD PRESSURE: 70 MMHG | HEIGHT: 70 IN | HEART RATE: 60 BPM | RESPIRATION RATE: 16 BRPM
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
## 2022-05-17 DIAGNOSIS — M25.561 ACUTE PAIN OF RIGHT KNEE: ICD-10-CM

## 2022-05-17 DIAGNOSIS — Z02.6 ENCOUNTER RELATED TO WORKER'S COMPENSATION CLAIM: Primary | ICD-10-CM

## 2022-05-17 PROCEDURE — 73560 X-RAY EXAM OF KNEE 1 OR 2: CPT | Mod: TC | Performed by: RADIOLOGY

## 2022-05-17 PROCEDURE — 99214 OFFICE O/P EST MOD 30 MIN: CPT | Performed by: FAMILY MEDICINE

## 2022-05-17 ASSESSMENT — PAIN SCALES - GENERAL: PAINLEVEL: MODERATE PAIN (4)

## 2022-05-17 NOTE — LETTER
May 17, 2022    RE:  Oskar Genao                              [address]  [place] [address]      To Whom It May Concern:      Oskar Genao was seen for acute knee pain after an injury.  Advised light duty for a week and follow up with provider for reevaluation.       Angie Logan DO        [facility]

## 2022-05-19 NOTE — RESULT ENCOUNTER NOTE
All your results are essentially jhonatan. Please contact me if you have any questions.  Take care,  Angie Logan D.O.

## 2022-09-11 ENCOUNTER — HEALTH MAINTENANCE LETTER (OUTPATIENT)
Age: 42
End: 2022-09-11

## 2023-01-22 ENCOUNTER — HEALTH MAINTENANCE LETTER (OUTPATIENT)
Age: 43
End: 2023-01-22

## 2024-02-24 ENCOUNTER — HEALTH MAINTENANCE LETTER (OUTPATIENT)
Age: 44
End: 2024-02-24

## 2024-12-11 ENCOUNTER — NURSE TRIAGE (OUTPATIENT)
Dept: FAMILY MEDICINE | Facility: CLINIC | Age: 44
End: 2024-12-11

## 2024-12-11 NOTE — TELEPHONE ENCOUNTER
There is a consent to communicate with Eder Correa (wife) on file dated 7/18/2017. 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 She reports that he woke up with a headache (migraine) this morning and he is vomiting on the floor. She was advised that I will need to triage him. She had him put in his hearing aids and he got on the phone.    No hx of Migraine on his problem list.     Patient reports that he woke up with a migraine and is vomiting. He does have migraine like this before and the last one was months ago. He has no plan in place for migraine due to his response to medications.    He needed to get off the phone to vomit and handed the phone over to his wife.    Nursing advice: Wife was advised he needs to be assessed at the [facility] immediately for his symptoms. Wife was given signs and symptoms to call 911. Wife verbalizes good understanding, agrees with plan and states she needs no further support. Norma Velazquez R.N.

## 2025-02-25 ENCOUNTER — VIRTUAL VISIT (OUTPATIENT)
Dept: FAMILY MEDICINE | Facility: CLINIC | Age: 45
End: 2025-02-25
Payer: COMMERCIAL

## 2025-02-25 DIAGNOSIS — R13.10 DYSPHAGIA, UNSPECIFIED TYPE: Primary | ICD-10-CM

## 2025-02-25 PROCEDURE — 98005 SYNCH AUDIO-VIDEO EST LOW 20: CPT | Performed by: PHYSICIAN ASSISTANT

## 2025-02-25 ASSESSMENT — ENCOUNTER SYMPTOMS
NAUSEA: 0
ABDOMINAL PAIN: 0
UNEXPECTED WEIGHT CHANGE: 0
COUGH: 0
DIAPHORESIS: 0
FEVER: 0
SHORTNESS OF BREATH: 0
TROUBLE SWALLOWING: 1
SORE THROAT: 0
VOMITING: 0

## 2025-02-25 NOTE — PROGRESS NOTES
Edgardo is a 44 year old who is being evaluated via a billable video visit.    How would you like to obtain your AVS? MyChart  If the video visit is dropped, the invitation should be resent by: Text to cell phone: [phone]  Will anyone else be joining your video visit? No      Assessment & Plan     Dysphagia, unspecified type  Patient is a 44-year-old male who presents to virtual visit requesting referral as he has had difficulty with food/fluids getting stuck for 10 years.  Patient did complete barium swallow study at the start of symptoms without acute abnormalities.  Discussed potential causes including reflux.  Patient declines medication trial of PPI and requests referral to specialist.  GI referral placed.  - Adult GI  Referral - Consult Only; Future        See Patient Instructions    Subjective   Edgardo is a 44 year old, presenting for the following health issues:  Problems Swallowing and referral request (ENT request)      History of Present Illness       Reason for visit:  Swallowing problems/ENT referral  Symptom onset:  More than a month  Symptoms include:  Swallows and food gets stuck, happens with all types of food.  Symptom intensity:  Severe  Symptom progression:  Worsening  Had these symptoms before:  Yes  Has tried/received treatment for these symptoms:  Yes  Previous treatment was successful:  No  What makes it worse:  Eating.  What makes it better:  Drinking a lot of water while eating.   He is taking medications regularly.     Patient notes he has been dealing with swallowing concerns while eating X at least 10 years. Food gets stuck in throat. Patient has to drink a significant amount of water during meals to deal with this issue. At time he has to induce vomiting to remove the blockage. Patient completed barium study without acute abnormalities at the start of symptoms. Patient will choke on fluids as well as foods. Meat and dry foods get stuck more often. Rare heartburn-no prior  medications for management.       Review of Systems   Constitutional:  Negative for diaphoresis, fever and unexpected weight change.   HENT:  Positive for trouble swallowing. Negative for sore throat.    Respiratory:  Negative for cough and shortness of breath.    Gastrointestinal:  Negative for abdominal pain, nausea and vomiting.           Objective           Vitals:  No vitals were obtained today due to virtual visit.    Physical Exam   GENERAL: alert and no distress  EYES: Eyes grossly normal to inspection.  No discharge or erythema, or obvious scleral/conjunctival abnormalities.  RESP: No audible wheeze, cough, or visible cyanosis.    SKIN: Visible skin clear. No significant rash, abnormal pigmentation or lesions.  NEURO: Cranial nerves grossly intact.  Mentation and speech appropriate for age.  PSYCH: Appropriate affect, tone, and pace of words        Video-Visit Details    Type of service:  Video Visit   Originating Location (pt. Location): Home    Distant Location (provider location):  On-site  Platform used for Video Visit: Rosario  Signed Electronically by: Lucy Ulrich PA-C

## 2025-02-25 NOTE — PATIENT INSTRUCTIONS
As requested, I have placed a referral to the gastroenterology team.  Scheduling will call you to set up your appointment.  Reach out with questions or concerns.

## 2025-02-27 NOTE — PROGRESS NOTES
[address] [address]  Phone: [phone]    Patient:  Oskar Genao, Date of birth 1980    Referring Provider Lucy Ulrich    Gastroenterology CLINIC VISIT, NEW PATIENT    REASON FOR CONSULTATION: dysphagia    HPI: 44 year old male was referred to GI clinic for a consultation of problems with swallowing for about 10 year. He believes that his symptoms are getting worse. Has sensation that food gets stuck in throat. Any type of foods, but more frequently with meats and dry foods. Patient has to drink a significant amount of water during meals to deal with this issue. At time he has to induce vomiting to remove the blockage. He chocks on liquids as well.    Patient completed barium study without acute abnormalities at the start of symptoms. Was seen by another provider who recommended a trial of a PPI.    PREVIOUS ENDOSCOPY:  None    PERTINENT STUDIES Reviewed in EMR  9/23/2015 Video swallowing evaluation  FINDINGS:  The examination was performed in conjunction with speech pathology and was videotaped. Various consistencies of barium were swallowed from a spoon and cup. There is no aspiration or penetration of the various consistencies of barium. No significant retention. Peristalsis and motility are normal.     ROS: 10pt ROS performed and otherwise negative.    PAST MEDICAL HISTORY:  Past Medical History:   Diagnosis Date    Hearing loss        PREVIOUS ABDOMINAL/GYNECOLOGIC SURGERIES:  No past surgical history on file.      PERTINENT MEDICATIONS:  No current outpatient medications on file.         SOCIAL HISTORY:  Social History     Socioeconomic History    Marital status:      Spouse name: Not on file    Number of children: Not on file    Years of education: Not on file    Highest education level: Not on file   Occupational History    Not on file   Tobacco Use    Smoking status: Never     Passive exposure: Never    Smokeless tobacco: Never    Vaping Use    Vaping status: Never Used   Substance and Sexual Activity    Alcohol use: No    Drug use: No    Sexual activity: Yes     Partners: Female   Other Topics Concern    Parent/sibling w/ CABG, MI or angioplasty before 65F 55M? Not Asked   Social History Narrative    Not on file     Social Drivers of Health     Financial Resource Strain: Not on file   Food Insecurity: Not on file   Transportation Needs: Not on file   Physical Activity: Not on file   Stress: Not on file   Social Connections: Unknown (1/8/2024)    [facility]    Social Connections     Frequency of Communication with Friends and Family: Not on file   Interpersonal Safety: Not on file   Housing Stability: Not on file       FAMILY HISTORY:  Denies colon/panc/esophageal/other GI CA, no other Walker or other HPS-related [place]. No IBD/celiac, no other AI/liver/thyroid disease.    Family History   Problem Relation Age of Onset    Depression Other     Glaucoma No family hx of     Macular Degeneration No family hx of        PHYSICAL EXAMINATION:  Vitals reviewed  There were no vitals taken for this visit.    General: Patient appears well in no acute distress.    Skin: No visualized rash or lesions on visualized skin  HEENT:    EOMI, no erythema, sclera icterus or discharge noted.  Mouth mucosa intact, pink, moist  No cervical or supraclavicular lymphadenopathy. Thyroid gland not enlarged.  Resp: breathing comfortably without accessory muscle usage, speaking in full sentences, no cough. Lung sounds clear  Card: Regular and rhythmic S1 and S2. No gallop or rub. No murmur.  No LE edema.  Abdomen: Active bowel sounds X 4 quadrants. Soft to palpation.  No guarding or rebound tenderness. Lima's sign negative.  MSK: Appears to have normal range of motion based on visualized movements  Neurologic: No apparent tremors, facial movements symmetric  Psych: affect normal,  "alert and oriented    Recent Labs:  Recent Labs   Lab Test 05/08/18  1323   WBC 5.5   HGB 15.4   HCT 44.2        No lab results found.    Invalid input(s): \"KAITLIN\", \"ALP\"  TSH   Date Value Ref Range Status   05/08/2018 1.72 0.40 - 4.00 mU/L Final     Recent Labs   Lab Test 09/28/21  1455 05/08/18  1323   CR 1.09 1.16         ASSESSMENT/PLAN:    44 year old male  presented to GI clinic for       Patient verbalized understanding and appreciation of care provided. Stated that all of the questions were answered to her/his satisfaction.  RTC    Thank you for this consultation. It was a pleasure to participate in the care of this patient; please contact us with any further questions. Due to the added complexity in care, I will continue to support the patient in the subsequent management and with ongoing continuity of care.     MILAN Haynes, FNP-C  Division of Gastroenterology  [facility], [place], [place]    This note was created with Dragon voice recognition software, and while reviewed for accuracy, inadvertent minor typographic errors may occur. Please contact the provider if you have any questions.   "

## 2025-02-27 NOTE — PATIENT INSTRUCTIONS
"It was a pleasure taking care of you today.  I've included a brief summary of our discussion and care plan from today's visit below.  Please review this information with your primary care provider.  ______________________________________________________________________    My recommendations are summarized as follows:    1.    2.    3.    4.     Return to GI Clinic in  to review your progress.    ______________________________________________________________________  Gastroesophageal reflux  Gastroesophageal reflux, also called \"acid reflux,\" occurs when the stomach contents back up into the esophagus and/or mouth. Occasional reflux is normal and can happen in healthy infants, children, and adults, most often after eating a large meal. Most episodes are brief and do not cause bothersome symptoms or complications.   By contrast, people with gastroesophageal reflux disease (GERD) experience bothersome symptoms or damage to the esophagus as a result of acid reflux. Symptoms of GERD can include heartburn, regurgitation, and difficulty or pain with swallowing.  The main cause of GERD is a transient relaxation or weakening of the lower esophageal sphincter (LES) which allows regurgitation of gastric acid and other gastric contents, including bile, back into the esophagus. The esophageal lining is susceptible to irritation by acid because it does not have the thick mucus protection of the stomach. Some people with GERD do not experience heartburn but may have burning sensation in the mouth, a feeling that food is stuck at any level of the esophagus, or hoarseness in the morning.  There are a number of predisposing factors associated with GERD, including a hiatal hernia, cigarette smoking, alcohol use, being overweight or obese, and pregnancy. Foods such as citrus fruits, chocolate, caffeinated drinks, fried foods, garlic, onions, spicy foods, and tomato-based foods, such as chili, pizza, and spaghetti sauce, are associated " with heartburn symptoms. Consumption of large high-fat meals requires prolonged gastric passage times and the increased stomach pressure may lead to movement of hydrochloric acid from the stomach into the esophagus. Additionally, lying prone after a meal promotes backflow of stomach contents and the development of symptoms.      Lifestyle modifications for gastroesophageal reflux disease (GERD).   1. Change your eating habits.  -- It's best to eat several small meals instead of two or three large meals.  -- After you eat, wait 2 to 3 hours before you lie down. Late-night snacks aren't a good idea.   -- Chocolate, mint, and alcohol can make GERD worse. They relax the valve between the esophagus and the stomach.  -- Spicy foods, foods that have a lot of acid (like tomatoes and oranges), foods with high fat content, and coffee can make GERD symptoms worse in some people. If your symptoms are worse after you eat certain foods, try to avoid them.     2. Do not smoke or chew tobacco. Saliva helps to neutralize refluxed acid, and smoking reduces the amount of saliva in the mouth and throat. Smoking also lowers the pressure in the lower esophageal sphincter and provokes coughing, causing frequent episodes of acid reflux in the esophagus.     3. If you have GERD symptoms at night, raise the head of your bed 6 in. (15 cm) to 8 in. (20 cm) by putting the frame on blocks or placing a foam wedge under the head of your mattress. (Adding extra pillows does not work.)    4. Avoid or reduce pressure on your stomach. Don't wear tight clothing around your middle.    5. Lose weight if you need to. Losing just 5 to 10 pounds can help.    6.Try diaphragmatic (belly) breathing. Research has indicated that people with GERD who practice belly breathing after eating reduce how often they experience acid reflux. Diaphragmatic breathing will reduce pressure within the stomach and increases tone of lower esophageal sphincter.  Practice these  breathing exercises 10 times each. Try to do your exercises 3 to 4 times each day. You can lie on your back or sit up straight in a chair to do these exercises.  ?Diaphragmatic breathing :  Place 1 hand over your abdomen and the other on your chest.  Slowly take a deep breath in through your nose. When you do this, think about your breath moving the hand on your abdomen out. This pulls more air into your lungs. The hand on your chest should not move very much if you are breathing the right way.  Breathe out slowly through pursed lips. Gently press on your belly as you breathe out. This will push up on your diaphragm to help get your air out.  Repeat.       ____________________________________________________________________  Please see below for any additional questions and scheduling guidelines.    Sign up for [employer]: [employer] patient portal serves as a secure platform for accessing your medical records from the [facility]. Additionally, [employer] facilitates easy, timely, and secure messaging with your care team. If you have not signed up, you may do so by using the provided code or calling [phone].    Coordinating your care after your visit:  There are multiple options for scheduling your follow-up care based on your provider's recommendation.    How do I schedule a follow-up clinic appointment:   After your appointment, you may receive scheduling assistance with the Clinic Coordinators by having a seat in the waiting room and a Clinic Coordinator will call you up to schedule.  Virtual visits or after you leave the clinic:  Your provider has placed a follow-up order in the [employer] portal for scheduling your return appointment. A member of the scheduling team will contact you to schedule.  [employer] Scheduling: Timely scheduling through [employer] is advised to ensure appointment availability.   Call to schedule: You may schedule your follow-up appointment(s) by calling [phone], option 1.    How do  I schedule my endoscopy or colonoscopy procedure:  If a procedure, such as a colonoscopy or upper endoscopy was ordered by your provider, the scheduling team will contact you to schedule this procedure. Or you may choose to call to schedule at   [phone], option 2.  Please allow 20-30 minutes when scheduling a procedure.    How do I get my blood work done? To get your blood work done, you need to schedule a lab appointment at an [facility]. There are multiple ways to schedule:   At the clinic: The Clinic Coordinator you meet after your visit can help you schedule a lab appointment.   [employer] scheduling: [employer] offers online lab scheduling at all [facility] laboratory locations.   Call to schedule: You can call [phone] to schedule your lab appointment.    How do I schedule my imaging study: To schedule imaging studies, such as CT scans, ultrasounds, MRIs, or X-rays, contact Imaging Services at [phone].    How do I schedule a referral to another doctor: If your provider recommended a referral to another specialist(s), the referral order was placed by your provider. You will receive a phone call to schedule this referral, or you may choose to call the number attached to the referral to self-schedule.    For Post-Visit Question(s):  For any inquiries following today's visit:  Please utilize [employer] messaging and allow 48 hours for reply or contact the Call Center during normal business hours at [phone], option 3.  For Emergent After-hours questions, contact the On-Call GI Fellow through the [facility] at [phone].  In addition, you may contact your Nurse directly using the provided contact information.    Test Results:  Test results will be accessible via [employer] in compliance with the 21st Century Cures Act. This means that your results will be available to you at the same time as your provider. Often you may see your results before your provider  does. Results are reviewed by staff within two weeks with communication follow-up. Results may be released in the patient portal prior to your care team review.    Prescription Refill(s):  Medication prescribed by your provider will be addressed during your visit. For future refills, please coordinate with your pharmacy. If you have not had a recent clinic visit or routine labs, for your safety, your provider may not be able to refill your prescription.     Sincerely,  VENICE Haynes,  [facility],  Division of Gastroenterology   ([facility])

## 2025-02-28 ENCOUNTER — OFFICE VISIT (OUTPATIENT)
Dept: GASTROENTEROLOGY | Facility: CLINIC | Age: 45
End: 2025-02-28
Attending: PHYSICIAN ASSISTANT
Payer: COMMERCIAL

## 2025-03-04 ENCOUNTER — TELEPHONE (OUTPATIENT)
Dept: GASTROENTEROLOGY | Facility: CLINIC | Age: 45
End: 2025-03-04
Payer: COMMERCIAL

## 2025-03-04 NOTE — TELEPHONE ENCOUNTER
"Endoscopy Scheduling Screen    Have you had any respiratory illness or flu-like symptoms in the last 10 days?  No    What is your communication preference for Instructions and/or Bowel Prep?   MyChart    What insurance is in the chart?  Other:  [employer]    Ordering/Referring Provider: ESTUARDO ROACH   (If ordering provider performs procedure, schedule with ordering provider unless otherwise instructed. )    BMI: Estimated body mass index is 29.84 kg/m  as calculated from the following:    Height as of 2/28/25: 1.778 m (5' 10\").    Weight as of 2/28/25: 94.3 kg (208 lb).     Sedation Ordered  moderate sedation.   If patient BMI > 50 do not schedule in [facility].    If patient BMI > 45 do not schedule at [facility].    Are you taking methadone or Suboxone?  NO, No RN review required.    Have you been diagnosed and are being treated for severe PTSD or severe anxiety?  NO, No RN review required.    Are you taking any prescription medications for pain 3 or more times per week?   NO, No RN review required.    Do you have a history of malignant hyperthermia?  No    (Females) Are you currently pregnant?   No     Have you been diagnosed or told you have pulmonary hypertension?   No    Do you have an LVAD?  No    Have you been told you have moderate to severe sleep apnea?  No.    Have you been told you have COPD, asthma, or any other lung disease?  No    Has your doctor ordered any cardiac tests like echo, angiogram, stress test, ablation, or EKG, that you have not completed yet?  No    Do you  have a history of any heart conditions?  No     Have you ever had or are you waiting for an organ transplant?  No. Continue scheduling, no site restrictions.    Have you had a stroke or transient ischemic attack (TIA aka \"mini stroke\") in the last 2 years?   No.    Have you been diagnosed with or been told you have cirrhosis of the liver?   No.    Are you currently on dialysis?   No    Do you need assistance transferring?   No    BMI: Estimated body " "mass index is 29.84 kg/m  as calculated from the following:    Height as of 2/28/25: 1.778 m (5' 10\").    Weight as of 2/28/25: 94.3 kg (208 lb).     Is patients BMI > 40 and scheduling location [facility]?  No    Do you take an injectable or oral medication for weight loss or diabetes (excluding insulin)?  No    Do you take the medication Naltrexone?  No    Do you take blood thinners?  No       Prep   Are you currently on dialysis or do you have chronic kidney disease?  No    Do you have a diagnosis of diabetes?  No    Do you have a diagnosis of cystic fibrosis (CF)?  No    On a regular basis do you go 3 -5 days between bowel movements?  No    BMI > 40?  No    Preferred Pharmacy:      [facility] - [address]  [address] [place] [address]  Phone: [phone] Fax: [phone]      Final Scheduling Details     Procedure scheduled  Upper endoscopy (EGD)    Surgeon:  BELEN     Date of procedure:  3/11/25     Pre-OP / PAC:   No - Not required for this site.    Location  [facility] - [facility] - Patient preference.    Sedation   Moderate Sedation - Per order.      Patient Reminders:   You will receive a call from a Nurse to review instructions and health history.  This assessment must be completed prior to your procedure.  Failure to complete the Nurse assessment may result in the procedure being cancelled.      On the day of your procedure, please designate an adult(s) who can drive you home stay with you for the next 24 hours. The medicines used in the exam will make you sleepy. You will not be able to drive.      You cannot take public transportation, ride share services, or non-medical taxi service without a responsible caregiver.  Medical transport services are allowed with the requirement that a responsible caregiver will receive you at your destination.  We require that drivers and caregivers are confirmed prior to your procedure.  "

## 2025-03-04 NOTE — TELEPHONE ENCOUNTER
Pre assessment completed for upcoming procedure.   (Please see previous telephone encounter notes for complete details)    Patient returned call.       Procedure details:    Approximate time and facility location reviewed.   Patient is aware that endoscopy team will be calling about 2 days prior to confirm arrival time.    Designated  policy reviewed and that site requests drivers to check in and stay on campus. Instructed to have someone stay 6  hours post procedure.   *Disclaimer - please notify the [facility] RN GI staff with any  issues/concerns.    Medication review:    Medications reviewed. Please see supporting documentation below. Holding recommendations discussed (if applicable).       Prep for procedure:     Procedure prep instructions reviewed.        Any additional information needed:  N/A      Patient verbalized understanding and had no questions or concerns at this time.      Melinda Adamson LPN  Endoscopy Procedure Pre Assessment   [phone] option 3

## 2025-03-04 NOTE — TELEPHONE ENCOUNTER
Attempted to contact patient in order to complete pre assessment questions.     No answer. Left message to return call to [phone] option 3.    Callback communication sent via [employer].    Keri Howell LPN

## 2025-03-04 NOTE — TELEPHONE ENCOUNTER
Pre visit planning completed.      Procedure details:    Patient scheduled for Upper endoscopy (EGD) on 3/11/25.     Approximate arrival time: 0655. Procedure time 0740.   *Ensure patient is aware that endoscopy team will be calling about 2 days prior to procedure date to confirm arrival time as this may change.     Facility location: [facility]; [address], [place] [address]. Check in location: 2nd Floor at Surgery desk.  *Disclaimer: Drivers are to check in with patient and stay on campus during procedure.     Sedation type: Conscious sedation     Pre op exam needed? No.    Indication for procedure: dysphagia      Chart review:     Electronic implanted devices? No    Recent diagnosis of diverticulitis within the last 6 weeks? No      Medication review:    Diabetic? No    Anticoagulants? No    Weight loss medication/injectable? No GLP-1 medication per patient's medication list. Nursing to verify with pre-assessment call.    Other medication HOLDING recommendations:  N/A      Prep for procedure:       Procedure information and instructions sent via [employer]         Corinne Kliber, RN  Endoscopy Procedure Pre Assessment   [phone] option 3

## 2025-03-09 ENCOUNTER — HEALTH MAINTENANCE LETTER (OUTPATIENT)
Age: 45
End: 2025-03-09

## 2025-03-11 ENCOUNTER — HOSPITAL ENCOUNTER (OUTPATIENT)
Facility: AMBULATORY SURGERY CENTER | Age: 45
Discharge: HOME | End: 2025-03-11
Attending: INTERNAL MEDICINE | Admitting: INTERNAL MEDICINE
Payer: COMMERCIAL

## 2025-03-11 VITALS
HEIGHT: 70 IN | WEIGHT: 208 LBS | DIASTOLIC BLOOD PRESSURE: 87 MMHG | HEART RATE: 74 BPM | SYSTOLIC BLOOD PRESSURE: 113 MMHG | OXYGEN SATURATION: 98 % | BODY MASS INDEX: 29.78 KG/M2 | RESPIRATION RATE: 16 BRPM

## 2025-03-11 LAB — UPPER GI ENDOSCOPY: NORMAL

## 2025-03-11 PROCEDURE — 43239 EGD BIOPSY SINGLE/MULTIPLE: CPT

## 2025-03-11 PROCEDURE — G8907 PT DOC NO EVENTS ON DISCHARG: HCPCS

## 2025-03-11 PROCEDURE — G8918 PT W/O PREOP ORDER IV AB PRO: HCPCS

## 2025-03-11 RX ORDER — DIPHENHYDRAMINE HYDROCHLORIDE 50 MG/ML
INJECTION, SOLUTION INTRAMUSCULAR; INTRAVENOUS PRN
Status: DISCONTINUED | OUTPATIENT
Start: 2025-03-11 | End: 2025-03-11 | Stop reason: HOSPADM

## 2025-03-11 RX ORDER — FENTANYL CITRATE 50 UG/ML
INJECTION, SOLUTION INTRAMUSCULAR; INTRAVENOUS PRN
Status: DISCONTINUED | OUTPATIENT
Start: 2025-03-11 | End: 2025-03-11 | Stop reason: HOSPADM

## 2025-03-11 NOTE — H&P
"[facility] Anesthesia Pre-op History and Physical    Oskar Genao MRN# [id number]   Age: 44 year old YOB: 1980            Date of Exam 3/11/2025       Primary care provider: Jeremie Vizcaino         Chief Complaint and/or Reason for Procedure:     dysphagia         Active problem list:     Patient Active Problem List    Diagnosis Date Noted    Concussion without loss of consciousness, subsequent encounter 08/26/2020     Priority: Medium    Microscopic hematuria 07/14/2016     Priority: Medium    Hyperlipidemia LDL goal <130 07/11/2016     Priority: Medium    Lateral epicondylitis (tennis elbow), left 05/13/2016     Priority: Medium    Seasonal allergies 03/16/2016     Priority: Medium    Hearing loss      Priority: Medium            Medications (include herbals and vitamins):   Any Plavix use in the last 7 days? No     No current outpatient medications on file.     No current facility-administered medications for this encounter.             Allergies:    No Known Allergies  Allergy to Latex? No  Allergy to tape?   No  Intolerances:             Physical Exam:   All vitals have been reviewed  Patient Vitals for the past 8 hrs:   BP Pulse Resp Height Weight   03/11/25 0700 126/89 63 16 1.778 m (5' 10\") 94.3 kg (208 lb)     No intake/output data recorded.  Lungs:   no increased work of breathing     Cardiovascular:   RRR             Lab / Radiology Results:            Anesthetic risk and/or ASA classification:     2  Tabby Kirkpatrick DO      "

## 2025-03-12 LAB
PATH REPORT.COMMENTS IMP SPEC: NORMAL
PATH REPORT.COMMENTS IMP SPEC: NORMAL
PATH REPORT.FINAL DX SPEC: NORMAL
PATH REPORT.GROSS SPEC: NORMAL
PATH REPORT.MICROSCOPIC SPEC OTHER STN: NORMAL
PATH REPORT.RELEVANT HX SPEC: NORMAL
PHOTO IMAGE: NORMAL

## 2025-03-13 DIAGNOSIS — K21.00 GASTROESOPHAGEAL REFLUX DISEASE WITH ESOPHAGITIS WITHOUT HEMORRHAGE: Primary | ICD-10-CM

## 2025-03-13 RX ORDER — OMEPRAZOLE 40 MG/1
40 CAPSULE, DELAYED RELEASE ORAL DAILY
Qty: 30 CAPSULE | Refills: 2 | Status: SHIPPED | OUTPATIENT
Start: 2025-03-13

## 2025-04-28 ENCOUNTER — VIRTUAL VISIT (OUTPATIENT)
Dept: GASTROENTEROLOGY | Facility: CLINIC | Age: 45
End: 2025-04-28
Attending: NURSE PRACTITIONER
Payer: COMMERCIAL

## 2025-04-28 DIAGNOSIS — K21.00 GASTROESOPHAGEAL REFLUX DISEASE WITH ESOPHAGITIS WITHOUT HEMORRHAGE: Primary | ICD-10-CM

## 2025-04-28 DIAGNOSIS — R19.5 LOOSE STOOLS: ICD-10-CM

## 2025-04-28 DIAGNOSIS — R13.14 PHARYNGOESOPHAGEAL DYSPHAGIA: ICD-10-CM

## 2025-04-28 PROCEDURE — 98006 SYNCH AUDIO-VIDEO EST MOD 30: CPT | Performed by: NURSE PRACTITIONER

## 2025-04-28 RX ORDER — SUCRALFATE 1 G/1
1 TABLET ORAL 3 TIMES DAILY
Qty: 42 TABLET | Refills: 0 | Status: SHIPPED | OUTPATIENT
Start: 2025-04-28 | End: 2025-05-12

## 2025-04-28 RX ORDER — PANTOPRAZOLE SODIUM 40 MG/1
40 TABLET, DELAYED RELEASE ORAL DAILY
Qty: 30 TABLET | Refills: 5 | Status: SHIPPED | OUTPATIENT
Start: 2025-04-28

## 2025-04-28 NOTE — PROGRESS NOTES
"Video-Visit Details    Type of service:  Video Visit    Video Start Time:  0820    Video End Time: 0831    Originating Location (pt. Location): Other at work    Distant Location (provider location):  [place], [place]    Platform used for Video Visit: Rosario             [address] [address]  Phone: [phone]    Patient:  Oskar Genao, Date of birth 1980    Referring Provider Lucy Ulrich    Gastroenterology CLINIC VISIT, RETURN PATIENT    REASON FOR CONSULTATION: dysphagia    HPI: 44 year old male  presented to the clinic for a follow up. He was seen problems with swallowing for about 15 years that was getting worse. Complained of sensation that food gets stuck in throat. Any type of foods bothers him, but more often he is chocking on meats, potatoes, and dry foods. Stated that he had to drink 1-2 cups of water before he start eating and also,significant amount of water during meals to help swallowing food. At times, he has to induce vomiting to remove the blockage. Said that expelled food \"does not come from my stomach, it's not sour\". He denied nausea. Denies hx of neck or chest surgery. No known issues of thyroid or cervical spine. Hx of concussion, has hearing loss, wearing h/aids. No family hx of throat or esophageal cancer. Does not use tobacco or alcohol. Patient completed video swallowing evaluation without acute abnormalities in 2015. Stated that he was just told to eat slow and chew food well.    Working diagnosis include but not limited to GERD,esophageal spasm, EoE, globus, diverticulum,esophageal stenosis, dysmotility of esophagus, hiatal hernia, and achalasia. Talked about thyromegaly, oropharyngeal stenosis, muscle tension dysphagia, sjogrens', and esophageal web. Suggested to proceed with upper GI endoscopy. Found signs of reflux esophagitis. Started on omeprazole 40 mg a day.  Patient complains of loose stools as a " side effect having 2-3 stools in the morning..  Stated his dysphagia had improved.  He had only 2 or 3 episodes of difficulty with swallowing since our last encounter.    PREVIOUS ENDOSCOPY:  3/11/2015 EGD by Dr. Kirkpatrick  Findings:       Mucosal changes were found in the entire esophagus. Esophageal findings        were graded using the Eosinophilic Esophagitis Endoscopic Reference        Score (EoE-EREFS) as: Edema Grade 1 Present (decreased clarity or        absence of vascular markings), Rings Grade 2 Moderate (distinct rings        that do not occlude passage of diagnostic 8-10 mm endoscope), Exudates        Grade 0 None (no white lesions seen), Furrows Grade 1 Mild (vertical        lines without visible depth) and Stricture none (no stricture found).        Biopsies were obtained from the proximal and distal esophagus with cold        forceps for histology of suspected eosinophilic esophagitis.        The Z-line was regular and was found 40 cm from the incisors.        The gastroesophageal flap valve was visualized endoscopically and        classified as Hill Grade I (prominent fold, tight to endoscope).        The entire examined stomach was normal.        The examined duodenum was normal.   Final Diagnosis   A. DISTAL ESOPHAGUS, BIOPSY:  - Squamous esophageal mucosa with basal cell hyperplasia, mild increase in intraepithelial lymphocytes, and scattered (up to 9 per HPF) eosinophils, suggestive of reflux.  - Negative for dysplasia or malignancy     B. PROXIMAL ESOPHAGUS, BIOPSY:  - Squamous esophageal mucosa without diagnostic abnormality  - No intraepithelial eosinophils  - Negative for dysplasia or malignancy       PERTINENT STUDIES Reviewed in EMR  9/23/2015 Video swallowing evaluation  FINDINGS:  The examination was performed in conjunction with speech pathology and was videotaped. Various consistencies of barium were swallowed from a spoon and cup. There is no aspiration or penetration of the various  consistencies of barium. No significant retention. Peristalsis and motility are normal.     ROS: 10pt ROS performed and otherwise negative.    PAST MEDICAL HISTORY:  Past Medical History:   Diagnosis Date    Hearing loss     TBI (traumatic brain injury) (H)        PREVIOUS ABDOMINAL/GYNECOLOGIC SURGERIES:  Past Surgical History:   Procedure Laterality Date    COMBINED ESOPHAGOSCOPY, GASTROSCOPY, DUODENOSCOPY (EGD) WITH CO2 INSUFFLATION N/A 3/11/2025    Procedure: Combined Esophagoscopy, Gastroscopy, Duodenoscopy (Egd) With Co2 Insufflation;  Surgeon: Tabby Kirkpatrick DO;  Location: [facility]    DENTAL SURGERY      ESOPHAGOSCOPY, GASTROSCOPY, DUODENOSCOPY (EGD), COMBINED N/A 3/11/2025    Procedure: ESOPHAGOGASTRODUODENOSCOPY, WITH BIOPSY;  Surgeon: Tabby Kirkpatrick DO;  Location: [facility] OR         PERTINENT MEDICATIONS:  Current Outpatient Medications   Medication Sig Dispense Refill    omeprazole (PRILOSEC) 40 MG DR capsule Take 1 capsule (40 mg) by mouth daily. 30 capsule 2    pantoprazole (PROTONIX) 40 MG EC tablet Take 1 tablet (40 mg) by mouth daily. Take 30-6 min before breakfast 30 tablet 5    sucralfate (CARAFATE) 1 GM tablet Take 1 tablet (1 g) by mouth 3 times daily for 14 days. Take sucralfate 1 g tablet twice a day between meals, and 1 more dose before bed. Dissolve a tablet in small amount of water. Do not eat or drink, and do not smoke for at least 30 minutes after taking the medication. 42 tablet 0         SOCIAL HISTORY:  Social History     Socioeconomic History    Marital status:      Spouse name: Not on file    Number of children: Not on file    Years of education: Not on file    Highest education level: Not on file   Occupational History    Not on file   Tobacco Use    Smoking status: Never     Passive exposure: Never    Smokeless tobacco: Never   Vaping Use    Vaping status: Never Used   Substance and Sexual Activity    Alcohol use: No    Drug use: No    Sexual activity: Yes     Partners: Female    Other Topics Concern    Parent/sibling w/ CABG, MI or angioplasty before 65F 55M? Not Asked   Social History Narrative    Not on file     Social Drivers of Health     Financial Resource Strain: Not on file   Food Insecurity: Not on file   Transportation Needs: Not on file   Physical Activity: Not on file   Stress: Not on file   Social Connections: Unknown (1/8/2024)    [facility]    Social Connections     Frequency of Communication with Friends and Family: Not on file   Interpersonal Safety: Low Risk  (3/11/2025)    Interpersonal Safety     Do you feel physically and emotionally safe where you currently live?: Yes     Within the past 12 months, have you been hit, slapped, kicked or otherwise physically hurt by someone?: No     Within the past 12 months, have you been humiliated or emotionally abused in other ways by your partner or ex-partner?: No   Housing Stability: Not on file       FAMILY HISTORY:  Denies colon/panc/esophageal/other GI CA, no other Walker or other HPS-related [place]. No IBD/celiac, no other AI/liver/thyroid disease.    Family History   Problem Relation Age of Onset    Depression Other     Glaucoma No family hx of     Macular Degeneration No family hx of        PHYSICAL EXAMINATION:  There were no vitals taken for this visit.    General: Patient appears well in no acute distress.    Skin: No visualized rash or lesions on visualized skin  HEENT:    EOMI, no erythema, sclera icterus or discharge noted.  Resp: breathing comfortably without accessory muscle usage, speaking in full sentences, no cough.  MSK: Appears to have normal range of motion based on visualized movements  Neurologic: No apparent tremors, facial movements symmetric  Psych: affect normal, alert and oriented    Recent Labs:  Recent Labs   Lab Test 05/08/18  1323   WBC 5.5   HGB 15.4   HCT 44.2          TSH   Date Value Ref Range Status    05/08/2018 1.72 0.40 - 4.00 mU/L Final     Recent Labs   Lab Test 09/28/21  1455 05/08/18  1323   CR 1.09 1.16         ASSESSMENT/PLAN:    ICD-10-CM    1. Choking episode occurring during daytime  R09.89       2. Pharyngoesophageal dysphagia  R13.14 Adult GI  Referral - Consult Only     Adult GI  Referral - Procedure Only         44 year old male  presented to GI clinic for ongoing symptoms of dysphagia for about 15 years. Stated that his symptoms are getting worse. The patient said that he chocks of solids and sometimes, on liquids and saliva. Drinks 1-2 cups of water before meals to prevent chocking. Due to sensation that food gets stuck in his throat, he was self inducing vomiting at times. Was sent to upper GI endoscopy and found to have esophagitis resembling EoE by appearance, but pathology revealed squamous esophageal mucosa with basal cell hyperplasia, mild increase in intraepithelial lymphocytes, and scattered (up to 9 per HPF) eosinophils, suggestive of reflux.  Patient was started on 40 mg of omeprazole daily. Said that it helped his dysphagia- had only 2 or 3 episodes since his last encounter. Complains of loose stools for 3-4 hours after taking omeprazole. Denies abdominal pain, hematochezia or melena.  Will switch from omeprazole to pantoprazole. Patient is questioning if he will need to take a PPI for the rest of his life. Advised to try it for 3 months and then, will attempt to wean if off and discontinue. Explained role of diet in his symptoms. Educated on GERD friendly diet.  Educated to start sucralfate 3 times a day for 14 days - 1 g tablet twice a day between meals, and the last dose before bed. Dissolve a tablet in small amount of water. Do not eat or drink, and do not smoke for at least 30 minutes after taking the medication.    Patient verbalized understanding and appreciation of care provided. Stated that all of the questions were answered to her/his satisfaction.  RTC in  8 weeks    Thank you for this consultation. It was a pleasure to participate in the care of this patient; please contact us with any further questions. Due to the added complexity in care, I will continue to support the patient in the subsequent management and with ongoing continuity of care.     MILAN Haynes, FNP-C  Division of Gastroenterology  [facility], [place], [place]    This note was created with Dragon voice recognition software, and while reviewed for accuracy, inadvertent minor typographic errors may occur. Please contact the provider if you have any questions.

## 2025-04-28 NOTE — LETTER
"4/28/2025      Oskar Genao  [address]  [facility] [address]      Dear Colleague,    Thank you for referring your patient, Oskar Genao, to the [facility]. Please see a copy of my visit note below.    Video-Visit Details    Type of service:  Video Visit    Video Start Time:  0820    Video End Time: 0831    Originating Location (pt. Location): Other at work    Distant Location (provider location):  [place], [place]    Platform used for Video Visit: Wikia             [facility]  [address] [facility] [address]  Phone: [phone]    Patient:  Oskar Genao, Date of birth 1980    Referring Provider Lucy Ulrich    Gastroenterology CLINIC VISIT, RETURN PATIENT    REASON FOR CONSULTATION: dysphagia    HPI: 44 year old male  presented to the clinic for a follow up. He was seen problems with swallowing for about 15 years that was getting worse. Complained of sensation that food gets stuck in throat. Any type of foods bothers him, but more often he is chocking on meats, potatoes, and dry foods. Stated that he had to drink 1-2 cups of water before he start eating and also,significant amount of water during meals to help swallowing food. At times, he has to induce vomiting to remove the blockage. Said that expelled food \"does not come from my stomach, it's not sour\". He denied nausea. Denies hx of neck or chest surgery. No known issues of thyroid or cervical spine. Hx of concussion, has hearing loss, wearing h/aids. No family hx of throat or esophageal cancer. Does not use tobacco or alcohol. Patient completed video swallowing evaluation without acute abnormalities in 2015. Stated that he was just told to eat slow and chew food well.    Working diagnosis include but not limited to GERD,esophageal spasm, EoE, globus, diverticulum,esophageal stenosis, dysmotility of esophagus, hiatal hernia, and achalasia. Talked about thyromegaly, " oropharyngeal stenosis, muscle tension dysphagia, sjogrens', and esophageal web. Suggested to proceed with upper GI endoscopy. Found signs of reflux esophagitis. Started on omeprazole 40 mg a day.  Patient complains of loose stools as a side effect having 2-3 stools in the morning..  Stated his dysphagia had improved.  He had only 2 or 3 episodes of difficulty with swallowing since our last encounter.    PREVIOUS ENDOSCOPY:  3/11/2015 EGD by Dr. Kirkpatrick  Findings:       Mucosal changes were found in the entire esophagus. Esophageal findings        were graded using the Eosinophilic Esophagitis Endoscopic Reference        Score (EoE-EREFS) as: Edema Grade 1 Present (decreased clarity or        absence of vascular markings), Rings Grade 2 Moderate (distinct rings        that do not occlude passage of diagnostic 8-10 mm endoscope), Exudates        Grade 0 None (no white lesions seen), Furrows Grade 1 Mild (vertical        lines without visible depth) and Stricture none (no stricture found).        Biopsies were obtained from the proximal and distal esophagus with cold        forceps for histology of suspected eosinophilic esophagitis.        The Z-line was regular and was found 40 cm from the incisors.        The gastroesophageal flap valve was visualized endoscopically and        classified as Hill Grade I (prominent fold, tight to endoscope).        The entire examined stomach was normal.        The examined duodenum was normal.   Final Diagnosis   A. DISTAL ESOPHAGUS, BIOPSY:  - Squamous esophageal mucosa with basal cell hyperplasia, mild increase in intraepithelial lymphocytes, and scattered (up to 9 per HPF) eosinophils, suggestive of reflux.  - Negative for dysplasia or malignancy     B. PROXIMAL ESOPHAGUS, BIOPSY:  - Squamous esophageal mucosa without diagnostic abnormality  - No intraepithelial eosinophils  - Negative for dysplasia or malignancy       PERTINENT STUDIES Reviewed in EMR  9/23/2015 Video  swallowing evaluation  FINDINGS:  The examination was performed in conjunction with speech pathology and was videotaped. Various consistencies of barium were swallowed from a spoon and cup. There is no aspiration or penetration of the various consistencies of barium. No significant retention. Peristalsis and motility are normal.     ROS: 10pt ROS performed and otherwise negative.    PAST MEDICAL HISTORY:  Past Medical History:   Diagnosis Date     Hearing loss      TBI (traumatic brain injury) (H)        PREVIOUS ABDOMINAL/GYNECOLOGIC SURGERIES:  Past Surgical History:   Procedure Laterality Date     COMBINED ESOPHAGOSCOPY, GASTROSCOPY, DUODENOSCOPY (EGD) WITH CO2 INSUFFLATION N/A 3/11/2025    Procedure: Combined Esophagoscopy, Gastroscopy, Duodenoscopy (Egd) With Co2 Insufflation;  Surgeon: Tabby Kirkpatrick DO;  Location: [facility] OR     DENTAL SURGERY       ESOPHAGOSCOPY, GASTROSCOPY, DUODENOSCOPY (EGD), COMBINED N/A 3/11/2025    Procedure: ESOPHAGOGASTRODUODENOSCOPY, WITH BIOPSY;  Surgeon: Tabby Kirkpatrick DO;  Location:  OR         PERTINENT MEDICATIONS:  Current Outpatient Medications   Medication Sig Dispense Refill     omeprazole (PRILOSEC) 40 MG DR capsule Take 1 capsule (40 mg) by mouth daily. 30 capsule 2     pantoprazole (PROTONIX) 40 MG EC tablet Take 1 tablet (40 mg) by mouth daily. Take 30-6 min before breakfast 30 tablet 5     sucralfate (CARAFATE) 1 GM tablet Take 1 tablet (1 g) by mouth 3 times daily for 14 days. Take sucralfate 1 g tablet twice a day between meals, and 1 more dose before bed. Dissolve a tablet in small amount of water. Do not eat or drink, and do not smoke for at least 30 minutes after taking the medication. 42 tablet 0         SOCIAL HISTORY:  Social History     Socioeconomic History     Marital status:      Spouse name: Not on file     Number of children: Not on file     Years of education: Not on file     Highest education level: Not on file   Occupational History     Not  on file   Tobacco Use     Smoking status: Never     Passive exposure: Never     Smokeless tobacco: Never   Vaping Use     Vaping status: Never Used   Substance and Sexual Activity     Alcohol use: No     Drug use: No     Sexual activity: Yes     Partners: Female   Other Topics Concern     Parent/sibling w/ CABG, MI or angioplasty before 65F 55M? Not Asked   Social History Narrative     Not on file     Social Drivers of Health     Financial Resource Strain: Not on file   Food Insecurity: Not on file   Transportation Needs: Not on file   Physical Activity: Not on file   Stress: Not on file   Social Connections: Unknown (1/8/2024)    [facility] [employer] [facility]    Social Connections      Frequency of Communication with Friends and Family: Not on file   Interpersonal Safety: Low Risk  (3/11/2025)    Interpersonal Safety      Do you feel physically and emotionally safe where you currently live?: Yes      Within the past 12 months, have you been hit, slapped, kicked or otherwise physically hurt by someone?: No      Within the past 12 months, have you been humiliated or emotionally abused in other ways by your partner or ex-partner?: No   Housing Stability: Not on file       FAMILY HISTORY:  Denies colon/panc/esophageal/other GI CA, no other Walker or other HPS-related [place]. No IBD/celiac, no other AI/liver/thyroid disease.    Family History   Problem Relation Age of Onset     Depression Other      Glaucoma No family hx of      Macular Degeneration No family hx of        PHYSICAL EXAMINATION:  There were no vitals taken for this visit.    General: Patient appears well in no acute distress.    Skin: No visualized rash or lesions on visualized skin  HEENT:    EOMI, no erythema, sclera icterus or discharge noted.  Resp: breathing comfortably without accessory muscle usage, speaking in full sentences, no cough.  MSK: Appears to have normal range of  motion based on visualized movements  Neurologic: No apparent tremors, facial movements symmetric  Psych: affect normal, alert and oriented    Recent Labs:  Recent Labs   Lab Test 05/08/18  1323   WBC 5.5   HGB 15.4   HCT 44.2          TSH   Date Value Ref Range Status   05/08/2018 1.72 0.40 - 4.00 mU/L Final     Recent Labs   Lab Test 09/28/21  1455 05/08/18  1323   CR 1.09 1.16         ASSESSMENT/PLAN:    ICD-10-CM    1. Choking episode occurring during daytime  R09.89       2. Pharyngoesophageal dysphagia  R13.14 Adult GI  Referral - Consult Only     Adult GI  Referral - Procedure Only         44 year old male  presented to GI clinic for ongoing symptoms of dysphagia for about 15 years. Stated that his symptoms are getting worse. The patient said that he chocks of solids and sometimes, on liquids and saliva. Drinks 1-2 cups of water before meals to prevent chocking. Due to sensation that food gets stuck in his throat, he was self inducing vomiting at times. Was sent to upper GI endoscopy and found to have esophagitis resembling EoE by appearance, but pathology revealed squamous esophageal mucosa with basal cell hyperplasia, mild increase in intraepithelial lymphocytes, and scattered (up to 9 per HPF) eosinophils, suggestive of reflux.  Patient was started on 40 mg of omeprazole daily. Said that it helped his dysphagia- had only 2 or 3 episodes since his last encounter. Complains of loose stools for 3-4 hours after taking omeprazole. Denies abdominal pain, hematochezia or melena.  Will switch from omeprazole to pantoprazole. Patient is questioning if he will need to take a PPI for the rest of his life. Advised to try it for 3 months and then, will attempt to wean if off and discontinue. Explained role of diet in his symptoms. Educated on GERD friendly diet.  Educated to start sucralfate 3 times a day for 14 days - 1 g tablet twice a day between meals, and the last dose before bed. Dissolve  a tablet in small amount of water. Do not eat or drink, and do not smoke for at least 30 minutes after taking the medication.    Patient verbalized understanding and appreciation of care provided. Stated that all of the questions were answered to her/his satisfaction.  RTC in 8 weeks    Thank you for this consultation. It was a pleasure to participate in the care of this patient; please contact us with any further questions. Due to the added complexity in care, I will continue to support the patient in the subsequent management and with ongoing continuity of care.     MILAN Haynes, FNP-C  Division of Gastroenterology  [facility], [place], [place]    This note was created with Dragon voice recognition software, and while reviewed for accuracy, inadvertent minor typographic errors may occur. Please contact the provider if you have any questions.       Again, thank you for allowing me to participate in the care of your patient.        Sincerely,        MILAN HAYNES CNP    Electronically signed

## 2025-04-28 NOTE — PATIENT INSTRUCTIONS
"It was a pleasure taking care of you today.  I've included a brief summary of our discussion and care plan from today's visit below.  Please review this information with your primary care provider.  ______________________________________________________________________    My recommendations are summarized as follows:    1.  As we discussed today, you found to have esophagitis on your upper GI endoscopy.  There was some redness and swelling of the esophageal lining, which is the likely cause of your swallowing problems.    2.  I did switch you from omeprazole to pantoprazole for acid reflux.  Take the medication 30 to 60 minutes before breakfast.    3. Take sucralfate 1 g tablet twice a day between meals, and 1 more dose before bed. Dissolve a tablet in small amount of water. Do not eat or drink, and do not smoke for at least 30 minutes after taking the medication.  Expected length of treatment: 14 days.    4.  Below, I placed more information on acid reflux and diet.  Please review at your convenience.    Return to GI Clinic in 3 months to review your progress.    ______________________________________________________________________  Gastroesophageal reflux  Gastroesophageal reflux, also called \"acid reflux,\" occurs when the stomach contents back up into the esophagus and/or mouth. Occasional reflux is normal and can happen in healthy infants, children, and adults, most often after eating a large meal. Most episodes are brief and do not cause bothersome symptoms or complications.   By contrast, people with gastroesophageal reflux disease (GERD) experience bothersome symptoms or damage to the esophagus as a result of acid reflux. Symptoms of GERD can include heartburn, regurgitation, and difficulty or pain with swallowing.  The main cause of GERD is a transient relaxation or weakening of the lower esophageal sphincter (LES) which allows regurgitation of gastric acid and other gastric contents, including bile, back " into the esophagus. The esophageal lining is susceptible to irritation by acid because it does not have the thick mucus protection of the stomach. Some people with GERD do not experience heartburn but may have burning sensation in the mouth, a feeling that food is stuck at any level of the esophagus, or hoarseness in the morning.  There are a number of predisposing factors associated with GERD, including a hiatal hernia, cigarette smoking, alcohol use, being overweight or obese, and pregnancy. Foods such as citrus fruits, chocolate, caffeinated drinks, fried foods, garlic, onions, spicy foods, and tomato-based foods, such as chili, pizza, and spaghetti sauce, are associated with heartburn symptoms. Consumption of large high-fat meals requires prolonged gastric passage times and the increased stomach pressure may lead to movement of hydrochloric acid from the stomach into the esophagus. Additionally, lying prone after a meal promotes backflow of stomach contents and the development of symptoms.      Lifestyle modifications for gastroesophageal reflux disease (GERD).   1. Change your eating habits.  -- It's best to eat several small meals instead of two or three large meals.  -- After you eat, wait 2 to 3 hours before you lie down. Late-night snacks aren't a good idea.   -- Chocolate, mint, and alcohol can make GERD worse. They relax the valve between the esophagus and the stomach.  -- Spicy foods, foods that have a lot of acid (like tomatoes and oranges), foods with high fat content, and coffee can make GERD symptoms worse in some people. If your symptoms are worse after you eat certain foods, try to avoid them.     2. Do not smoke or chew tobacco. Saliva helps to neutralize refluxed acid, and smoking reduces the amount of saliva in the mouth and throat. Smoking also lowers the pressure in the lower esophageal sphincter and provokes coughing, causing frequent episodes of acid reflux in the esophagus.     3. If you  have GERD symptoms at night, raise the head of your bed 6 in. (15 cm) to 8 in. (20 cm) by putting the frame on blocks or placing a foam wedge under the head of your mattress. (Adding extra pillows does not work.)    4. Avoid or reduce pressure on your stomach. Don't wear tight clothing around your middle.    5. Lose weight if you need to. Losing just 5 to 10 pounds can help.    6.Try diaphragmatic (belly) breathing. Research has indicated that people with GERD who practice belly breathing after eating reduce how often they experience acid reflux. Diaphragmatic breathing will reduce pressure within the stomach and increases tone of lower esophageal sphincter.  Practice these breathing exercises 10 times each. Try to do your exercises 3 to 4 times each day. You can lie on your back or sit up straight in a chair to do these exercises.  ?Diaphragmatic breathing :  Place 1 hand over your abdomen and the other on your chest.  Slowly take a deep breath in through your nose. When you do this, think about your breath moving the hand on your abdomen out. This pulls more air into your lungs. The hand on your chest should not move very much if you are breathing the right way.  Breathe out slowly through pursed lips. Gently press on your belly as you breathe out. This will push up on your diaphragm to help get your air out.  Repeat.       ____________________________________________________________________  Please see below for any additional questions and scheduling guidelines.    Sign up for [employer]: [facility] patient portal serves as a secure platform for accessing your medical records from the [facility]. Additionally, [employer] facilitates easy, timely, and secure messaging with your care team. If you have not signed up, you may do so by using the provided code or calling [phone].    Coordinating your care after your visit:  There are multiple options for scheduling your follow-up care based on your provider's  recommendation.    How do I schedule a follow-up clinic appointment:   After your appointment, you may receive scheduling assistance with the Clinic Coordinators by having a seat in the waiting room and a Clinic Coordinator will call you up to schedule.  Virtual visits or after you leave the clinic:  Your provider has placed a follow-up order in the [employer] portal for scheduling your return appointment. A member of the scheduling team will contact you to schedule.  [facility] Scheduling: Timely scheduling through [employer] is advised to ensure appointment availability.   Call to schedule: You may schedule your follow-up appointment(s) by calling [phone], option 1.    How do I schedule my endoscopy or colonoscopy procedure:  If a procedure, such as a colonoscopy or upper endoscopy was ordered by your provider, the scheduling team will contact you to schedule this procedure. Or you may choose to call to schedule at   [phone], option 2.  Please allow 20-30 minutes when scheduling a procedure.    How do I get my blood work done? To get your blood work done, you need to schedule a lab appointment at an [facility]. There are multiple ways to schedule:   At the clinic: The Clinic Coordinator you meet after your visit can help you schedule a lab appointment.   [facility] scheduling: [facility] offers online lab scheduling at all [facility] laboratory locations.   Call to schedule: You can call [phone] to schedule your lab appointment.    How do I schedule my imaging study: To schedule imaging studies, such as CT scans, ultrasounds, MRIs, or X-rays, contact Imaging Services at [phone].    How do I schedule a referral to another doctor: If your provider recommended a referral to another specialist(s), the referral order was placed by your provider. You will receive a phone call to schedule this referral, or you may choose to call the number attached to the referral to self-schedule.    For  Post-Visit Question(s):  For any inquiries following today's visit:  Please utilize [employer] messaging and allow 48 hours for reply or contact the Call Center during normal business hours at [phone], option 3.  For Emergent After-hours questions, contact the On-Call GI Fellow through the [facility]  at [phone].  In addition, you may contact your Nurse directly using the provided contact information.    Test Results:  Test results will be accessible via [employer] in compliance with the 21st Century Cures Act. This means that your results will be available to you at the same time as your provider. Often you may see your results before your provider does. Results are reviewed by staff within two weeks with communication follow-up. Results may be released in the patient portal prior to your care team review.    Prescription Refill(s):  Medication prescribed by your provider will be addressed during your visit. For future refills, please coordinate with your pharmacy. If you have not had a recent clinic visit or routine labs, for your safety, your provider may not be able to refill your prescription.     Sincerely,  VENICE Haynes,  [facility],  Division of Gastroenterology   ([facility])

## 2025-06-14 ENCOUNTER — E-VISIT (OUTPATIENT)
Dept: URGENT CARE | Facility: CLINIC | Age: 45
End: 2025-06-14
Payer: COMMERCIAL

## 2025-06-14 DIAGNOSIS — L03.011 CELLULITIS OF FINGER OF RIGHT HAND: Primary | ICD-10-CM

## 2025-06-14 RX ORDER — CEPHALEXIN 500 MG/1
500 CAPSULE ORAL 4 TIMES DAILY
Qty: 28 CAPSULE | Refills: 0 | Status: SHIPPED | OUTPATIENT
Start: 2025-06-14 | End: 2025-06-21

## 2025-06-14 NOTE — PATIENT INSTRUCTIONS
Dear Oskar Genao    1. Cellulitis of finger of right hand (Primary)    - cephALEXin (KEFLEX) 500 MG capsule; Take 1 capsule (500 mg) by mouth 4 times daily for 7 days.  Dispense: 28 capsule; Refill: 0      Thanks for choosing us as your health care partner,    Karina Echols PA-C

## 2025-07-03 NOTE — PROGRESS NOTES
Oskar Genao is a 44 year old patient who is being evaluated via a billable virtual visit.       How would you like to obtain your AVS? MyChart  If the video visit is dropped, the invitation should be resent by: Text to cell phone: [phone]  Will anyone else be joining your video visit? No    Video-Visit Details     Type of service:  Video Visit     Video Start Time (time video started): 8: 56     Video End Time (time video stopped): 9: 07     Physician has received verbal consent for a Video Visit from the patient? Yes     Originating Location (pt. Location): Home    Distant Location (provider location):  Off-site    Platform used for Video Visit: [address] [address]  Phone: [phone]    Patient:  Oskar Genao, Date of birth 1980  Date of Visit:  7/24/25     GASTROENTEROLOGY RETURN PATIENT VIDEO VISIT    CC/REFERRING MD:    Jeremie Vizcaino    REASON FOR CONSULTATION:   Referred for Follow Up (Gastroesophageal reflux disease with esophagitis without hemorrhage/Loose stools/Pharyngoesophageal dysphagia/)      HISTORY OF PRESENT ILLNESS:  Oskar Genao is a 45 year old male who presents to GI clinic for a follow up. Patient was seen for ongoing dysphagia X 15 years that was getting worse. Found signs of reflux esophagitis on EGD. Started on omeprazole 40 mg a day.  Patient could not tolerate the medication, complained of loose stools as a side effect of a PPI, was having 2-3 stools in the morning. Switched to pantoprazole X 3 months. Provided 2 weeks tx with sucralfate.  Stated his dysphagia had improved.  Patient shared that he just returned from vacation in [place].  He forgot to take his medications with him.  Stated that carbonated beverages and some junk food were aggravating his symptoms.  He resumed pantoprazole yesterday.  He denies nausea or vomiting.  No abdominal pain.  He has bowel movements are back to  normal.  Overall, patient is feeling good.  He denies any red flag or concerning symptoms.      Wt Readings from Last 10 Encounters:   03/11/25 94.3 kg (208 lb)   02/28/25 94.3 kg (208 lb)   05/17/22 84.4 kg (186 lb)   10/29/21 86.5 kg (190 lb 12.8 oz)   09/28/21 87.1 kg (192 lb)   09/15/21 86.2 kg (190 lb)   10/03/20 79.4 kg (175 lb)   09/17/20 79.4 kg (175 lb)   09/03/20 81.5 kg (179 lb 11.2 oz)   08/26/20 80.7 kg (178 lb)       PREVIOUS ENDOSCOPY:  3/11/2015 EGD by Dr. Kirkpatrick  Findings:       Mucosal changes were found in the entire esophagus. Esophageal findings        were graded using the Eosinophilic Esophagitis Endoscopic Reference        Score (EoE-EREFS) as: Edema Grade 1 Present (decreased clarity or        absence of vascular markings), Rings Grade 2 Moderate (distinct rings        that do not occlude passage of diagnostic 8-10 mm endoscope), Exudates        Grade 0 None (no white lesions seen), Furrows Grade 1 Mild (vertical        lines without visible depth) and Stricture none (no stricture found).        Biopsies were obtained from the proximal and distal esophagus with cold        forceps for histology of suspected eosinophilic esophagitis.        The Z-line was regular and was found 40 cm from the incisors.        The gastroesophageal flap valve was visualized endoscopically and        classified as Hill Grade I (prominent fold, tight to endoscope).        The entire examined stomach was normal.        The examined duodenum was normal.   Final Diagnosis   A. DISTAL ESOPHAGUS, BIOPSY:  - Squamous esophageal mucosa with basal cell hyperplasia, mild increase in intraepithelial lymphocytes, and scattered (up to 9 per HPF) eosinophils, suggestive of reflux.  - Negative for dysplasia or malignancy     B. PROXIMAL ESOPHAGUS, BIOPSY:  - Squamous esophageal mucosa without diagnostic abnormality  - No intraepithelial eosinophils  - Negative for dysplasia or malignancy     PERTINENT IMAGING STUDIES WERE  REVIEWED IN EMR    HISTORY:   has a past medical history of Hearing loss and TBI (traumatic brain injury) (H).     has a past surgical history that includes Dental surgery; Combined Esophagoscopy, Gastroscopy, Duodenoscopy (Egd) With Co2 Insufflation (N/A, 3/11/2025); and Esophagoscopy, gastroscopy, duodenoscopy (EGD), combined (N/A, 3/11/2025).     reports that he has never smoked. He has never been exposed to tobacco smoke. He has never used smokeless tobacco. He reports that he does not drink alcohol and does not use drugs.    family history includes Depression in an other family member.    ALLERGIES:   No Known Allergies    PERTINENT MEDICATIONS:    Current Outpatient Medications:     omeprazole (PRILOSEC) 40 MG DR capsule, Take 1 capsule (40 mg) by mouth daily., Disp: 30 capsule, Rfl: 2    pantoprazole (PROTONIX) 40 MG EC tablet, Take 1 tablet (40 mg) by mouth daily. Take 30-6 min before breakfast, Disp: 30 tablet, Rfl: 5      ROS: 10pt ROS performed and otherwise negative.    PHYSICAL EXAMINATION:  Wt:   Wt Readings from Last 2 Encounters:   03/11/25 94.3 kg (208 lb)   02/28/25 94.3 kg (208 lb)      Physical Exam  Vitals reviewed: There were no vitals taken for this visit.   Constitutional: aaox3, cooperative, pleasant, not dyspneic/diaphoretic, no acute distress  Eyes: Sclera anicteric/injected  Respiratory: Unlabored breathing, speaking in full sentences.   Psych: Normal affect, speech is clear and appropriate.Neatly groomed    RECENT LABS:   Recent Labs   Lab Test 05/08/18  1323   WBC 5.5   HGB 15.4   HCT 44.2        Recent Labs   Lab Test 09/28/21  1455 05/08/18  1323   CR 1.09 1.16     TSH   Date Value Ref Range Status   05/08/2018 1.72 0.40 - 4.00 mU/L Final         ASSESSMENT/PLAN:    ICD-10-CM    1. Gastroesophageal reflux disease with esophagitis without hemorrhage  K21.00       2. Pharyngoesophageal dysphagia  R13.14       3. Loose stools  R19.5          Oskar Genao is a 45 year old male  who presents to GI clinic for a follow up.  The patient was seen for ongoing esophageal dysphagia for many years, that was getting worse.  Was sent to upper GI endoscopy.  Found to have esophagitis that resembled EoE by appearance, but pathology revealed squamous esophageal mucosa with basal cell hyperplasia, mild increase in intraepithelial lymphocytes, and scattered (up to 9 per HPF) eosinophils.  He was treated for reflux esophagitis with a PPI and sucralfate.  Stated that his symptoms had almost completely resolved.  Reported intermittent discomfort on swallowing when consumes carbonated beverages or some junk food.  Suggested to continue pantoprazole once a day for additional 4 weeks.  Then, attempt to slowly wean the medication off.  Educated to take Tums or similar over-the-counter antiacids if needed.  Reminded on GERD friendly diet.  Patient knows to return to our office should his symptoms recur or become worse.  In that case, we could repeat upper GI endoscopy to reassess for possible EOE.  Patient verbalized understanding and appreciation of care provided. Stated that all of the questions were answered to her/his satisfaction.  Follow up as needed  This note was created with Dragon voice recognition software. Inadvertent minor typographic errors may occur in transcription. Feel free to contact the provider if you have any questions.  I sincerely appreciate an opportunity to provide consultation for this pleasant patient.    VENICE Haynes  [facility],  Gastroenterology,  [place], [place]

## 2025-07-24 ENCOUNTER — VIRTUAL VISIT (OUTPATIENT)
Dept: GASTROENTEROLOGY | Facility: CLINIC | Age: 45
End: 2025-07-24
Payer: COMMERCIAL

## 2025-07-24 DIAGNOSIS — R13.14 PHARYNGOESOPHAGEAL DYSPHAGIA: ICD-10-CM

## 2025-07-24 DIAGNOSIS — R19.5 LOOSE STOOLS: ICD-10-CM

## 2025-07-24 DIAGNOSIS — K21.00 GASTROESOPHAGEAL REFLUX DISEASE WITH ESOPHAGITIS WITHOUT HEMORRHAGE: Primary | ICD-10-CM

## 2025-07-24 NOTE — PATIENT INSTRUCTIONS
"It was a pleasure taking care of you today.  I've included a brief summary of our discussion and care plan from today's visit below.  Please review this information with your primary care provider.  ______________________________________________________________________    My recommendations are summarized as follows:    1.  Continue to take pantoprazole every morning, 30 to 60 minutes before breakfast, for 1 month.  Then, take it every other day for 2 weeks.  Attempt to discontinue the medication by slowly weaning it off.  Use Tums or Rolaids as needed for acid reflux.    2.  Avoid foods that commonly trigger acid reflux.  Below, I placed more information on GERD, please review at your convenience.      Return to GI Clinic as needed   ______________________________________________________________________  Gastroesophageal reflux  Gastroesophageal reflux, also called \"acid reflux,\" occurs when the stomach contents back up into the esophagus and/or mouth. Occasional reflux is normal and can happen in healthy infants, children, and adults, most often after eating a large meal. Most episodes are brief and do not cause bothersome symptoms or complications.   By contrast, people with gastroesophageal reflux disease (GERD) experience bothersome symptoms or damage to the esophagus as a result of acid reflux. Symptoms of GERD can include heartburn, regurgitation, and difficulty or pain with swallowing.  The main cause of GERD is a transient relaxation or weakening of the lower esophageal sphincter (LES) which allows regurgitation of gastric acid and other gastric contents, including bile, back into the esophagus. The esophageal lining is susceptible to irritation by acid because it does not have the thick mucus protection of the stomach. Some people with GERD do not experience heartburn but may have burning sensation in the mouth, a feeling that food is stuck at any level of the esophagus, or hoarseness in the " morning.  There are a number of predisposing factors associated with GERD, including a hiatal hernia, cigarette smoking, alcohol use, being overweight or obese, and pregnancy. Foods such as citrus fruits, chocolate, caffeinated drinks, fried foods, garlic, onions, spicy foods, and tomato-based foods, such as chili, pizza, and spaghetti sauce, are associated with heartburn symptoms. Consumption of large high-fat meals requires prolonged gastric passage times and the increased stomach pressure may lead to movement of hydrochloric acid from the stomach into the esophagus. Additionally, lying prone after a meal promotes backflow of stomach contents and the development of symptoms.      Lifestyle modifications for gastroesophageal reflux disease (GERD).   1. Change your eating habits.  -- It's best to eat several small meals instead of two or three large meals.  -- After you eat, wait 2 to 3 hours before you lie down. Late-night snacks aren't a good idea.   -- Chocolate, mint, and alcohol can make GERD worse. They relax the valve between the esophagus and the stomach.  -- Spicy foods, foods that have a lot of acid (like tomatoes and oranges), foods with high fat content, and coffee can make GERD symptoms worse in some people. If your symptoms are worse after you eat certain foods, try to avoid them.     2. Do not smoke or chew tobacco. Saliva helps to neutralize refluxed acid, and smoking reduces the amount of saliva in the mouth and throat. Smoking also lowers the pressure in the lower esophageal sphincter and provokes coughing, causing frequent episodes of acid reflux in the esophagus.     3. If you have GERD symptoms at night, raise the head of your bed 6 in. (15 cm) to 8 in. (20 cm) by putting the frame on blocks or placing a foam wedge under the head of your mattress. (Adding extra pillows does not work.)    4. Avoid or reduce pressure on your stomach. Don't wear tight clothing around your middle.    5. Lose weight  if you need to. Losing just 5 to 10 pounds can help.    6.Try diaphragmatic (belly) breathing. Research has indicated that people with GERD who practice belly breathing after eating reduce how often they experience acid reflux. Diaphragmatic breathing will reduce pressure within the stomach and increases tone of lower esophageal sphincter.  Practice these breathing exercises 10 times each. Try to do your exercises 3 to 4 times each day. You can lie on your back or sit up straight in a chair to do these exercises.  ?Diaphragmatic breathing :  Place 1 hand over your abdomen and the other on your chest.  Slowly take a deep breath in through your nose. When you do this, think about your breath moving the hand on your abdomen out. This pulls more air into your lungs. The hand on your chest should not move very much if you are breathing the right way.  Breathe out slowly through pursed lips. Gently press on your belly as you breathe out. This will push up on your diaphragm to help get your air out.  Repeat.       ____________________________________________________________________  Please see below for any additional questions and scheduling guidelines.    Sign up for [employer]: [employer] patient portal serves as a secure platform for accessing your medical records from the [facility]. Additionally, [employer] facilitates easy, timely, and secure messaging with your care team. If you have not signed up, you may do so by using the provided code or calling [phone].    Coordinating your care after your visit:  There are multiple options for scheduling your follow-up care based on your provider's recommendation.    How do I schedule a follow-up clinic appointment:   After your appointment, you may receive scheduling assistance with the Clinic Coordinators by having a seat in the waiting room and a Clinic Coordinator will call you up to schedule.  Virtual visits or after you leave the clinic:  Your provider has  placed a follow-up order in the [facility] portal for scheduling your return appointment. A member of the scheduling team will contact you to schedule.  [facility] Scheduling: Timely scheduling through [employer] is advised to ensure appointment availability.   Call to schedule: You may schedule your follow-up appointment(s) by calling [phone], option 1.    How do I schedule my endoscopy or colonoscopy procedure:  If a procedure, such as a colonoscopy or upper endoscopy was ordered by your provider, the scheduling team will contact you to schedule this procedure. Or you may choose to call to schedule at   [phone], option 2.  Please allow 20-30 minutes when scheduling a procedure.    How do I get my blood work done? To get your blood work done, you need to schedule a lab appointment at an [facility]. There are multiple ways to schedule:   At the clinic: The Clinic Coordinator you meet after your visit can help you schedule a lab appointment.   [facility] scheduling: [facility] offers online lab scheduling at all [facility] laboratory locations.   Call to schedule: You can call [phone] to schedule your lab appointment.    How do I schedule my imaging study: To schedule imaging studies, such as CT scans, ultrasounds, MRIs, or X-rays, contact Imaging Services at [phone].    How do I schedule a referral to another doctor: If your provider recommended a referral to another specialist(s), the referral order was placed by your provider. You will receive a phone call to schedule this referral, or you may choose to call the number attached to the referral to self-schedule.    For Post-Visit Question(s):  For any inquiries following today's visit:  Please utilize [facility] messaging and allow 48 hours for reply or contact the Call Center during normal business hours at [phone], option 3.  For Emergent After-hours questions, contact the On-Call GI Fellow through the [facility]  at [phone].  In addition, you may contact your Nurse directly using the provided contact information.    Test Results:  Test results will be accessible via [employer] in compliance with the 21st Century Cures Act. This means that your results will be available to you at the same time as your provider. Often you may see your results before your provider does. Results are reviewed by staff within two weeks with communication follow-up. Results may be released in the patient portal prior to your care team review.    Prescription Refill(s):  Medication prescribed by your provider will be addressed during your visit. For future refills, please coordinate with your pharmacy. If you have not had a recent clinic visit or routine labs, for your safety, your provider may not be able to refill your prescription.     Sincerely,  VENICE Haynes,  [facility],  Division of Gastroenterology   ([facility])

## (undated) RX ORDER — DIPHENHYDRAMINE HYDROCHLORIDE 50 MG/ML
INJECTION, SOLUTION INTRAMUSCULAR; INTRAVENOUS
Status: DISPENSED
Start: 2025-03-11

## (undated) RX ORDER — FENTANYL CITRATE 50 UG/ML
INJECTION, SOLUTION INTRAMUSCULAR; INTRAVENOUS
Status: DISPENSED
Start: 2025-03-11